# Patient Record
Sex: FEMALE | Race: WHITE | NOT HISPANIC OR LATINO | Employment: OTHER | URBAN - METROPOLITAN AREA
[De-identification: names, ages, dates, MRNs, and addresses within clinical notes are randomized per-mention and may not be internally consistent; named-entity substitution may affect disease eponyms.]

---

## 2017-09-05 ENCOUNTER — TELEPHONE (OUTPATIENT)
Dept: ONCOLOGY | Facility: CLINIC | Age: 57
End: 2017-09-05

## 2019-04-09 ENCOUNTER — VIRTUAL VISIT (OUTPATIENT)
Dept: FAMILY MEDICINE | Facility: OTHER | Age: 59
End: 2019-04-09

## 2019-04-09 NOTE — PROGRESS NOTES
"Date:   Clinician: Radha Villareal  Clinician NPI: [id number]  Patient: Brittany Michael  Patient : 1960  Patient Address: [address] [place], [place] [address]  Patient Phone: [phone]  Visit Protocol: Low back pain  Patient Summary:  Brittany is a 58 year old ( : 1960 ) female who initiated a Visit for evaluation of Low Back Pain. When asked the question \"Please sign me up to receive news, health information and promotions from [employer].\", Brittany responded \"No\".   A synchronous visit is necessary because the patient reported the following abnormal symptoms:   Severe pain that began suddenly and age &gt; 50   Her back pain began suddenly 1-6 days ago. The pain is present on both sides and is located in the mid back area.   The pain varies depending on the activity and goes away when resting.   She is able to walk on her toes and is able to walk on her heels with her toes lifted off the ground.   Brittany is experiencing back muscle spasms.    Symptom Details   Pain: The pain is severe (7-9 on a 10 point pain scale).    Brittany denies feeling feverish, loss of bladder control, a rash in the same area as the back pain, abdominal pain, loss of bowel control, saddle anesthesia, numbness or tingling in the legs, shooting pain, urinary frequency, vomiting, urinary retention, leg weakness, dysuria, and hematuria. Her pain does not decrease when bending forward.   Precipitating events  The back pain did not begin in response to an injury that happened at her work. Her back pain began after sudden bending or lifting.   Pertinent medical history  She has had back surgery. Details about back surgery as reported by the patient (free text): Lumbar fusion approximately 17 years ago (not kimo of date)   She also has been diagnosed with cancer. Details about cancer history as reported by the patient (free text): Invasive ductile carcinoma left breast  Brittany has not had similar episodes of back pain in " the past. She has not had kidney stones and unintended weight loss in the last three months.   She has not seen a provider to treat this episode of low back pain.   Treatments  Brittany tried using therapeutic remedies (such as cold pack, heat, chiropractic treatment, physical therapy) and over-the-counter medications to manage her low back pain.   Additional details about medications tried as reported by the patient (free text): Ibuprofen 200mg 2-3 pills 3 times a day excedrin extra strength 2 pills one time Sunday afternoon   Other approaches used to manage the back pain as reported by the patient (free text): Rest, ice packs   Brittany has not tried using prescription medications to manage her back pain.   Brittany does not smoke or use smokeless tobacco.   MEDICATIONS: hydroxyzine pamoate oral, quetiapine oral, ALLERGIES: NKDA  Clinician Response:  Dear Brittany,   Based on the information you have provided, you may have low back pain due to pinched or compressed spinal nerves.  When the nerve root becomes compressed, the pressure could cause various symptoms including pain, weakness, numbness, and tingling. A more complete health history, examination, and testing are possibly needed. For this reason, I would like you to make an appointment to be seen in person.  Medication information  I am prescribing:     Cyclobenzaprine 10 mg oral tablet. Take 1 tablet by mouth 3 times per day as needed for 7 days. There are no refills with this prescription.   Unless you are allergic to the over-the-counter medication(s) below, I recommend using:       Acetaminophen (Tylenol or store brand) oral tablet. Take 1-2 pills by mouth every 4-6 hours as needed for pain.      Ibuprofen (Advil or store brand) 200 mg oral tablet. Take 2-3 200mg tablets (400-600mg) by mouth every 8 hours for pain. Do not exceed 2400mg in 24 hours.     Taking this medication with food will decrease the risk of stomach discomfort.  Over-the-counter medications do  not require a prescription. Ask the pharmacist if you have any questions.  Self care  The following tips will help prevent and reduce back pain:     Apply heating pads on the sore area for a short duration (10 minutes per hour and as needed). A hot bath or a heating pad on your lower back may also help reduce pain and stiffness.    Maintaining a good posture reduces stress on the back muscles. To help reduce the stress on your back:    Stand and sit up straight.    Try not to slouch when standing or sitting.    Try not to stay in the same position for a long time.    Switch positions every 20 to 30 minutes if possible.    When lifting heavy objects, squat down and use your legs rather than bending at the waist.          Stress can make your back pain worse. For this reason, take time to relax and try some relaxation techniques when you feel stressed.    Click the following link for more information: Prevent back pain.     When to seek care  Please make an appointment to be seen in a clinic or urgent care if any of the following occur:  You develop new symptoms or your symptoms becomes worse  A painful rash that appears as a stripe along one side of the body  Unexplained fever  Unbearable pain  Unrelenting night pain  Seek immediate medical care if you have problems with bowel or bladder control, worsening weakness or numbness in your legs, or numbness in the inner thighs, groin, or buttocks.   Diagnosis: Acute radiculopathy/radiculitis  Diagnosis ICD: M54.10  Triage Notes: I reviewed the patient's history, verified their identity, and explained the phone visit process.    She was lifting items on her front porch when she feels like she pulled a muscle in her upper back.  Synchronous Triage: phone, status: completed, duration: 156 seconds  Prescription: cyclobenzaprine 10 mg oral tablet 21 tablet, 7 days supply. Take 1 tablet by mouth 3 times per day as needed for 7 days. Refills: 0, Refill as needed: no, Allow  substitutions: yes  Pharmacy: [facility] - [phone] - [address] [place] [id number]

## 2020-01-23 ENCOUNTER — OFFICE VISIT (OUTPATIENT)
Dept: FAMILY MEDICINE | Facility: CLINIC | Age: 60
End: 2020-01-23
Payer: COMMERCIAL

## 2020-01-23 VITALS
HEIGHT: 71 IN | WEIGHT: 182.6 LBS | HEART RATE: 74 BPM | SYSTOLIC BLOOD PRESSURE: 126 MMHG | BODY MASS INDEX: 25.56 KG/M2 | DIASTOLIC BLOOD PRESSURE: 79 MMHG | RESPIRATION RATE: 12 BRPM

## 2020-01-23 DIAGNOSIS — Z13.1 DIABETES MELLITUS SCREENING: ICD-10-CM

## 2020-01-23 DIAGNOSIS — Z13.220 LIPID SCREENING: ICD-10-CM

## 2020-01-23 DIAGNOSIS — G47.00 INSOMNIA, UNSPECIFIED TYPE: ICD-10-CM

## 2020-01-23 DIAGNOSIS — R91.8 PULMONARY NODULES: ICD-10-CM

## 2020-01-23 DIAGNOSIS — Z12.31 ENCOUNTER FOR SCREENING MAMMOGRAM FOR BREAST CANCER: ICD-10-CM

## 2020-01-23 DIAGNOSIS — F10.21 ALCOHOLISM IN REMISSION (H): ICD-10-CM

## 2020-01-23 DIAGNOSIS — M25.50 POLYARTHRALGIA: ICD-10-CM

## 2020-01-23 DIAGNOSIS — F32.A DEPRESSION, UNSPECIFIED DEPRESSION TYPE: ICD-10-CM

## 2020-01-23 DIAGNOSIS — Z00.00 ROUTINE GENERAL MEDICAL EXAMINATION AT A HEALTH CARE FACILITY: Primary | ICD-10-CM

## 2020-01-23 DIAGNOSIS — Z23 NEED FOR PROPHYLACTIC VACCINATION AND INOCULATION AGAINST INFLUENZA: ICD-10-CM

## 2020-01-23 DIAGNOSIS — F41.9 ANXIETY: ICD-10-CM

## 2020-01-23 DIAGNOSIS — Z85.3 HX: BREAST CANCER: ICD-10-CM

## 2020-01-23 LAB
CHOLEST SERPL-MCNC: 238 MG/DL
CREAT SERPL-MCNC: 0.92 MG/DL (ref 0.52–1.04)
GFR SERPL CREATININE-BSD FRML MDRD: 67 ML/MIN/{1.73_M2}
GLUCOSE SERPL-MCNC: 94 MG/DL (ref 70–99)
HDLC SERPL-MCNC: 52 MG/DL
LDLC SERPL CALC-MCNC: 159 MG/DL
NONHDLC SERPL-MCNC: 186 MG/DL
TRIGL SERPL-MCNC: 136 MG/DL

## 2020-01-23 PROCEDURE — 99214 OFFICE O/P EST MOD 30 MIN: CPT | Mod: 25 | Performed by: PHYSICIAN ASSISTANT

## 2020-01-23 PROCEDURE — 90471 IMMUNIZATION ADMIN: CPT | Performed by: PHYSICIAN ASSISTANT

## 2020-01-23 PROCEDURE — 90472 IMMUNIZATION ADMIN EACH ADD: CPT | Performed by: PHYSICIAN ASSISTANT

## 2020-01-23 PROCEDURE — 36415 COLL VENOUS BLD VENIPUNCTURE: CPT | Performed by: PHYSICIAN ASSISTANT

## 2020-01-23 PROCEDURE — 82947 ASSAY GLUCOSE BLOOD QUANT: CPT | Performed by: PHYSICIAN ASSISTANT

## 2020-01-23 PROCEDURE — 90715 TDAP VACCINE 7 YRS/> IM: CPT | Performed by: PHYSICIAN ASSISTANT

## 2020-01-23 PROCEDURE — 80061 LIPID PANEL: CPT | Performed by: PHYSICIAN ASSISTANT

## 2020-01-23 PROCEDURE — 90682 RIV4 VACC RECOMBINANT DNA IM: CPT | Performed by: PHYSICIAN ASSISTANT

## 2020-01-23 PROCEDURE — 82565 ASSAY OF CREATININE: CPT | Performed by: PHYSICIAN ASSISTANT

## 2020-01-23 PROCEDURE — 99386 PREV VISIT NEW AGE 40-64: CPT | Mod: 25 | Performed by: PHYSICIAN ASSISTANT

## 2020-01-23 RX ORDER — CELECOXIB 200 MG/1
200 CAPSULE ORAL 2 TIMES DAILY PRN
Qty: 180 CAPSULE | Refills: 3 | Status: SHIPPED | OUTPATIENT
Start: 2020-01-23 | End: 2021-03-19

## 2020-01-23 RX ORDER — BUPROPION HYDROCHLORIDE 300 MG/1
300 TABLET ORAL EVERY MORNING
Qty: 90 TABLET | Refills: 3 | Status: SHIPPED | OUTPATIENT
Start: 2020-01-23 | End: 2021-03-19

## 2020-01-23 RX ORDER — AMITRIPTYLINE HYDROCHLORIDE 10 MG/1
10 TABLET ORAL AT BEDTIME
Qty: 90 TABLET | Refills: 11 | Status: SHIPPED | OUTPATIENT
Start: 2020-01-23 | End: 2021-03-19

## 2020-01-23 RX ORDER — BUPROPION HYDROCHLORIDE 300 MG/1
TABLET ORAL
COMMUNITY
Start: 2019-08-06 | End: 2020-01-23

## 2020-01-23 RX ORDER — CELECOXIB 200 MG/1
CAPSULE ORAL
COMMUNITY
Start: 2019-10-01 | End: 2020-01-23

## 2020-01-23 RX ORDER — HYDROXYZINE PAMOATE 25 MG/1
CAPSULE ORAL
COMMUNITY
Start: 2019-06-11 | End: 2020-01-23

## 2020-01-23 RX ORDER — HYDROXYZINE PAMOATE 25 MG/1
CAPSULE ORAL
Qty: 90 CAPSULE | Refills: 1 | Status: SHIPPED | OUTPATIENT
Start: 2020-01-23 | End: 2020-07-08

## 2020-01-23 RX ORDER — QUETIAPINE FUMARATE 50 MG/1
TABLET, FILM COATED ORAL
COMMUNITY
Start: 2019-08-06 | End: 2020-01-23 | Stop reason: SINTOL

## 2020-01-23 ASSESSMENT — ENCOUNTER SYMPTOMS
WEAKNESS: 0
DYSURIA: 0
PALPITATIONS: 0
ABDOMINAL PAIN: 0
JOINT SWELLING: 0
NERVOUS/ANXIOUS: 1
DIARRHEA: 0
HEARTBURN: 0
COUGH: 0
EYE PAIN: 0
CONSTIPATION: 0
HEMATURIA: 0
MYALGIAS: 0
HEADACHES: 1
NAUSEA: 0
ARTHRALGIAS: 1
HEMATOCHEZIA: 0
CHILLS: 0
BREAST MASS: 0
FEVER: 0
FREQUENCY: 0
SHORTNESS OF BREATH: 0
PARESTHESIAS: 0
SORE THROAT: 0
DIZZINESS: 0

## 2020-01-23 ASSESSMENT — MIFFLIN-ST. JEOR: SCORE: 1494.4

## 2020-01-23 NOTE — LETTER
January 24, 2020      Brittany Michael  [address] [facility] [address]        Dear ,    We are writing to inform you of your test results.    Blood sugar is normal.    Kidney function ok.  Cholesterol is somewhat high.  This does not need medication, but should be treated with healthy lifestyle.  Several things can help your cholesterol.  Suggestions include healthy eating and physical activity.  A good physical activity goal for heart health is 30 minutes of moderate activity (such as walking at a speed which gets your heart rate up or your breathing up) 5 days per week. For healthy eating, consider the Mediterranean diet.  More information of this diet can be found at [email or web address].   Let's plan to recheck your cholesterol in 1 year.    Resulted Orders   GLUCOSE   Result Value Ref Range    Glucose 94 70 - 99 mg/dL      Comment:      Fasting specimen   Lipid panel reflex to direct LDL Non-fasting   Result Value Ref Range    Cholesterol 238 (H) <200 mg/dL      Comment:      Desirable:       <200 mg/dl    Triglycerides 136 <150 mg/dL      Comment:      Fasting specimen    HDL Cholesterol 52 >49 mg/dL    LDL Cholesterol Calculated 159 (H) <100 mg/dL      Comment:      Above desirable:  100-129 mg/dl  Borderline High:  130-159 mg/dL  High:             160-189 mg/dL  Very high:       >189 mg/dl      Non HDL Cholesterol 186 (H) <130 mg/dL      Comment:      Above Desirable:  130-159 mg/dl  Borderline high:  160-189 mg/dl  High:             190-219 mg/dl  Very high:       >219 mg/dl     Creatinine   Result Value Ref Range    Creatinine 0.92 0.52 - 1.04 mg/dL    GFR Estimate 67 >60 mL/min/[1.73_m2]      Comment:      Non  GFR Calc  Starting 12/18/2018, serum creatinine based estimated GFR (eGFR) will be   calculated using the Chronic Kidney Disease Epidemiology Collaboration    (CKD-EPI) equation.      GFR Estimate If Black 78 >60 mL/min/[1.73_m2]      Comment:       GFR Calc  Starting 12/18/2018, serum creatinine based estimated GFR (eGFR) will be   calculated using the Chronic Kidney Disease Epidemiology Collaboration   (CKD-EPI) equation.         If you have any questions or concerns, please call the clinic at the number listed above.       Sincerely,        Nelida Finney PA-C/hp
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
## 2020-01-23 NOTE — PATIENT INSTRUCTIONS
Cholesterol and blood sugar labs today   Decided to hold off on hiv and hep c labs until we get records  Hold off on chest xray until we get records  Oncology referral - see if they need to continue seeing you or a survivorship plan    Tetanus and flu shot today     Consider new shingles shot.  Need 2 doses.  Some people don't feel great day of, or for a few days.  How you feel after first dose does not predict whether you'll feel good or bad after next dose.  In case you have side effects, pick a time to get the vaccine that its ok if you feel a bit under the weather.  Take this precaution with both doses of the vaccine, even if you feel great after the first dose.  Pharmacy is often cheaper than clinic and can at least tell you your cost in advance, unlike a clinic.    Increase calcium intake.  Goal 1200 mg.    Stop quietapine.  Try amitriptyline instead.    Mammogram    [place] Mammo Schedule      [facility] Mammo Schedule  [facility] ~ [phone]  One Day Weekly- Alternating Days    [place] ~ [phone]  Every other Monday or Wednesday   & one Saturday morning a month    [place] ~ [phone]  Every Other Monday Morning    [place] ~ [phone]  Every Other Monday Afternoon    [place] ~ [phone]  Every Monday morning  Every Tuesday afternoon     Wed, Thurs, Friday morning & afternoon    Mammogram walk-in hours in [place]: Monday-Friday, 8 a.m. - 4 p.m.  Questions? Call [phone].      Preventive Health Recommendations  Female Ages 50 - 64    Yearly exam: See your health care provider every year in order to  o Review health changes.   o Discuss preventive care.    o Review your medicines if your doctor has prescribed any.      Get a Pap test every three years (unless you have an abnormal result and your provider advises testing more often).    If you get Pap tests with HPV test, you only need to test every 5 years, unless you have an abnormal result.     You do not need a Pap test if your  uterus was removed (hysterectomy) and you have not had cancer.    You should be tested each year for STDs (sexually transmitted diseases) if you're at risk.     Have a mammogram every 1 to 2 years.    Have a colonoscopy at age 50, or have a yearly FIT test (stool test). These exams screen for colon cancer.      Have a cholesterol test every 5 years, or more often if advised.    Have a diabetes test (fasting glucose) every three years. If you are at risk for diabetes, you should have this test more often.     If you are at risk for osteoporosis (brittle bone disease), think about having a bone density scan (DEXA).    Shots: Get a flu shot each year. Get a tetanus shot every 10 years.    Nutrition:     Eat at least 5 servings of fruits and vegetables each day.    Eat whole-grain bread, whole-wheat pasta and brown rice instead of white grains and rice.    Get adequate Calcium and Vitamin D.     Lifestyle    Exercise at least 150 minutes a week (30 minutes a day, 5 days a week). This will help you control your weight and prevent disease.    Limit alcohol to one drink per day.    No smoking.     Wear sunscreen to prevent skin cancer.     See your dentist every six months for an exam and cleaning.    See your eye doctor every 1 to 2 years.

## 2020-01-23 NOTE — NURSING NOTE
"Chief Complaint   Patient presents with     Physical       Initial /79 (BP Location: Right arm, Patient Position: Chair, Cuff Size: Adult Regular)   Pulse 74   Resp 12   Ht 1.803 m (5' 11\")   Wt 82.8 kg (182 lb 9.6 oz)   LMP 05/04/2002   BMI 25.47 kg/m   Estimated body mass index is 25.47 kg/m  as calculated from the following:    Height as of this encounter: 1.803 m (5' 11\").    Weight as of this encounter: 82.8 kg (182 lb 9.6 oz).    Patient presents to the clinic using No DME    Health Maintenance that is potentially due pending provider review:  NONE        Is there anyone who you would like to be able to receive your results? No  If yes have patient fill out RAMON      "

## 2020-02-17 ENCOUNTER — OFFICE VISIT (OUTPATIENT)
Dept: FAMILY MEDICINE | Facility: CLINIC | Age: 60
End: 2020-02-17
Payer: COMMERCIAL

## 2020-02-17 DIAGNOSIS — N63.0 LUMP OR MASS IN BREAST: Primary | ICD-10-CM

## 2020-02-17 PROCEDURE — 99214 OFFICE O/P EST MOD 30 MIN: CPT | Performed by: FAMILY MEDICINE

## 2020-02-18 NOTE — PROGRESS NOTES
SUBJECTIVE: Brittany Michael is a 59 year old female    No chief complaint on file.     I am dictating this chart later in the day after clinic visit due to our computers being down.  She had concerns of lump in her left breast in the upper portion for the last week or 2.  She had breast exam during physical just 3 weeks ago.  She has had previous history of breast cancer in her left breast 14 to 15 years ago.  She was treated with a lumpectomy chemo and radiation.  She was on tamoxifen briefly but did not tolerate and this was discontinued.  She actually came in for a mammogram today but was recommended she be evaluated for diagnostic mammogram.    The lump does not cause any pain or symptoms.  Her last mammogram was over a year ago.  She had no other health concerns today  Patient Active Problem List   Diagnosis     HX: breast cancer     Pulmonary nodules     Depression, unspecified depression type     Alcoholism in remission (H)      OBJECTIVE:Last menstrual period 05/04/2002. BMI=There is no height or weight on file to calculate BMI.  GENERAL APPEARANCE ADULT: Alert, no acute distress  BREAST: Breasts both appear normal.  She has a scar in the upper left breast.  The area in question is just medial to that scar.  Both breasts are normal to palpation on my exam.  The area she is referring to she cannot feel when supine and with sitting she does note a lump.  When I feel that area it seems more like rib to me than the actual mass.    ASSESSMENT:   (N63.0) Lump or mass in breast  (primary encounter diagnosis)  Comment:   Plan: MA Diagnostic Digital Bilateral        I have ordered a diagnostic mammogram to evaluate this area as well as to do bilateral mammogram.  If the diagnostic studies are normal I would not recommend surgical consultation unless this area seems to changed over time.

## 2020-02-25 ENCOUNTER — HOSPITAL ENCOUNTER (OUTPATIENT)
Dept: MAMMOGRAPHY | Facility: CLINIC | Age: 60
Discharge: HOME | End: 2020-02-25
Attending: FAMILY MEDICINE | Admitting: FAMILY MEDICINE
Payer: COMMERCIAL

## 2020-02-25 ENCOUNTER — HOSPITAL ENCOUNTER (OUTPATIENT)
Dept: ULTRASOUND IMAGING | Facility: CLINIC | Age: 60
End: 2020-02-25
Attending: FAMILY MEDICINE
Payer: COMMERCIAL

## 2020-02-25 DIAGNOSIS — N63.0 LUMP OR MASS IN BREAST: ICD-10-CM

## 2020-02-25 PROCEDURE — 76642 ULTRASOUND BREAST LIMITED: CPT | Mod: LT

## 2020-02-25 PROCEDURE — G0279 TOMOSYNTHESIS, MAMMO: HCPCS

## 2020-07-08 DIAGNOSIS — F41.9 ANXIETY: ICD-10-CM

## 2020-07-08 RX ORDER — HYDROXYZINE PAMOATE 25 MG/1
CAPSULE ORAL
Qty: 90 CAPSULE | Refills: 1 | Status: SHIPPED | OUTPATIENT
Start: 2020-07-08 | End: 2020-12-24

## 2020-12-23 DIAGNOSIS — F41.9 ANXIETY: ICD-10-CM

## 2020-12-24 RX ORDER — HYDROXYZINE PAMOATE 25 MG/1
CAPSULE ORAL
Qty: 90 CAPSULE | Refills: 0 | Status: SHIPPED | OUTPATIENT
Start: 2020-12-24 | End: 2021-03-19

## 2021-03-15 DIAGNOSIS — F41.9 ANXIETY: ICD-10-CM

## 2021-03-17 RX ORDER — HYDROXYZINE PAMOATE 25 MG/1
CAPSULE ORAL
Qty: 90 CAPSULE | OUTPATIENT
Start: 2021-03-17

## 2021-03-19 ENCOUNTER — OFFICE VISIT (OUTPATIENT)
Dept: FAMILY MEDICINE | Facility: CLINIC | Age: 61
End: 2021-03-19
Payer: COMMERCIAL

## 2021-03-19 VITALS
OXYGEN SATURATION: 97 % | RESPIRATION RATE: 14 BRPM | HEIGHT: 71 IN | SYSTOLIC BLOOD PRESSURE: 110 MMHG | TEMPERATURE: 97.2 F | DIASTOLIC BLOOD PRESSURE: 72 MMHG | WEIGHT: 182 LBS | HEART RATE: 58 BPM | BODY MASS INDEX: 25.48 KG/M2

## 2021-03-19 DIAGNOSIS — E78.5 DYSLIPIDEMIA: ICD-10-CM

## 2021-03-19 DIAGNOSIS — Z11.59 NEED FOR HEPATITIS C SCREENING TEST: ICD-10-CM

## 2021-03-19 DIAGNOSIS — F12.10 CANNABIS ABUSE, EPISODIC: ICD-10-CM

## 2021-03-19 DIAGNOSIS — F32.A DEPRESSION, UNSPECIFIED DEPRESSION TYPE: Primary | ICD-10-CM

## 2021-03-19 DIAGNOSIS — Z11.4 ENCOUNTER FOR SCREENING FOR HIV: ICD-10-CM

## 2021-03-19 DIAGNOSIS — G47.00 INSOMNIA, UNSPECIFIED TYPE: ICD-10-CM

## 2021-03-19 DIAGNOSIS — Z13.220 LIPID SCREENING: ICD-10-CM

## 2021-03-19 DIAGNOSIS — F41.9 ANXIETY: ICD-10-CM

## 2021-03-19 DIAGNOSIS — F10.21 ALCOHOLISM IN REMISSION (H): ICD-10-CM

## 2021-03-19 LAB
CHOLEST SERPL-MCNC: 207 MG/DL
HCV AB SERPL QL IA: NONREACTIVE
HDLC SERPL-MCNC: 56 MG/DL
HIV 1+2 AB+HIV1 P24 AG SERPL QL IA: NONREACTIVE
LDLC SERPL CALC-MCNC: 134 MG/DL
NONHDLC SERPL-MCNC: 151 MG/DL
TRIGL SERPL-MCNC: 86 MG/DL

## 2021-03-19 PROCEDURE — 36415 COLL VENOUS BLD VENIPUNCTURE: CPT | Performed by: PHYSICIAN ASSISTANT

## 2021-03-19 PROCEDURE — 87389 HIV-1 AG W/HIV-1&-2 AB AG IA: CPT | Performed by: PHYSICIAN ASSISTANT

## 2021-03-19 PROCEDURE — 86803 HEPATITIS C AB TEST: CPT | Performed by: PHYSICIAN ASSISTANT

## 2021-03-19 PROCEDURE — 99214 OFFICE O/P EST MOD 30 MIN: CPT | Performed by: PHYSICIAN ASSISTANT

## 2021-03-19 PROCEDURE — 80061 LIPID PANEL: CPT | Performed by: PHYSICIAN ASSISTANT

## 2021-03-19 RX ORDER — AMITRIPTYLINE HYDROCHLORIDE 10 MG/1
10 TABLET ORAL AT BEDTIME
Qty: 90 TABLET | Refills: 11 | Status: SHIPPED | OUTPATIENT
Start: 2021-03-19 | End: 2021-10-13

## 2021-03-19 RX ORDER — FLUOXETINE 10 MG/1
10 CAPSULE ORAL DAILY
Qty: 30 CAPSULE | Refills: 0 | Status: SHIPPED | OUTPATIENT
Start: 2021-03-19 | End: 2021-10-13

## 2021-03-19 RX ORDER — BUPROPION HYDROCHLORIDE 150 MG/1
150 TABLET ORAL EVERY MORNING
Qty: 30 TABLET | Refills: 0 | Status: SHIPPED | OUTPATIENT
Start: 2021-03-19 | End: 2021-10-13

## 2021-03-19 RX ORDER — HYDROXYZINE PAMOATE 25 MG/1
CAPSULE ORAL
Qty: 90 CAPSULE | Refills: 0 | Status: SHIPPED | OUTPATIENT
Start: 2021-03-19 | End: 2021-10-13

## 2021-03-19 ASSESSMENT — ANXIETY QUESTIONNAIRES
GAD7 TOTAL SCORE: 5
6. BECOMING EASILY ANNOYED OR IRRITABLE: NOT AT ALL
7. FEELING AFRAID AS IF SOMETHING AWFUL MIGHT HAPPEN: NOT AT ALL
7. FEELING AFRAID AS IF SOMETHING AWFUL MIGHT HAPPEN: NOT AT ALL
2. NOT BEING ABLE TO STOP OR CONTROL WORRYING: NOT AT ALL
GAD7 TOTAL SCORE: 5
1. FEELING NERVOUS, ANXIOUS, OR ON EDGE: MORE THAN HALF THE DAYS
3. WORRYING TOO MUCH ABOUT DIFFERENT THINGS: NOT AT ALL
GAD7 TOTAL SCORE: 5
5. BEING SO RESTLESS THAT IT IS HARD TO SIT STILL: SEVERAL DAYS
4. TROUBLE RELAXING: MORE THAN HALF THE DAYS

## 2021-03-19 ASSESSMENT — PATIENT HEALTH QUESTIONNAIRE - PHQ9
SUM OF ALL RESPONSES TO PHQ QUESTIONS 1-9: 4
SUM OF ALL RESPONSES TO PHQ QUESTIONS 1-9: 4

## 2021-03-19 ASSESSMENT — MIFFLIN-ST. JEOR: SCORE: 1486.68

## 2021-03-19 NOTE — PROGRESS NOTES
"Assessment & Plan     Depression, unspecified depression type  worsened  - buPROPion (WELLBUTRIN XL) 150 MG 24 hr tablet; Take 1 tablet (150 mg) by mouth every morning    Insomnia, unspecified type  worsened  - amitriptyline (ELAVIL) 10 MG tablet; Take 1 tablet (10 mg) by mouth At Bedtime Can increase to 2 pills in 3 nights, and to 3 tabs in 3 nights if needed.    Anxiety  worsened  - FLUoxetine (PROZAC) 10 MG capsule; Take 1 capsule (10 mg) by mouth daily  - hydrOXYzine (VISTARIL) 25 MG capsule; TAKE 2 CAPSULES 3 TIMES A DAY AS NEEDED FOR ANXIETY ATTACK    Alcoholism in remission (H)  stable    Cannabis abuse, episodic  New issue    Dyslipidemia  Lipid screening  - Lipid panel reflex to direct LDL Non-fasting    Need for hepatitis C screening test  - Hepatitis C Screen Reflex to HCV RNA Quant and Genotype    Encounter for screening for HIV  - HIV Antigen Antibody Combo    Patient Instructions   Restarting lower dose of wellbutrin   Start prozac 10 mg to help anxiety   Can retry sleep medication amitriptyline  Refilled hydroxyzine to have on hand   Sign for psychiatry records  Follow up here in 4 wks, or with psychiatry     Rechecking cholesterol       No follow-ups on file.    AUDREY [facility]    Marcellus Soto is a 60 year old who presents for the following health issues     HPI     Depression and Anxiety Follow-Up    How are you doing with your depression since your last visit? Feels good, but sometimes anxiety takes over    How are you doing with your anxiety since your last visit?  No change     Are you having other symptoms that might be associated with depression or anxiety? No    Have you had a significant life event? No     Do you have any concerns with your use of alcohol or other drugs? No    Moved from city and bought hobby farm 2 yrs ago.  Has really enjoyed the \"haven\" home has become in the past 1 yr.  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  Some stress with not seeing daughter and some higher health risk family members for a year.  But things not all good, has been considering seeing her psychiatrist that she's seen for many years.    Hard to stay asleep. Hard to be still.  Still anxious.      Took self off wellbutrin and amitriptyline.  Thinks amitriptyline was ok but not great, and can't remember why she stopped it.  Does take her hydroxyzine most evenings.  Gets very energized and talkative.  Psychiatry said she was not quite bipolar.  Has remained sober from alcohol.  Has been experimented with CBD and marijuana - smoked marijuana helps more than edible but expensive.      Dyslipidemia - has not been eating well lately.    Social History     Tobacco Use     Smoking status: Former Smoker     Packs/day: 1.00     Years: 26.00     Pack years: 26.00     Types: Cigarettes     Quit date: 2002     Years since quittin.8     Smokeless tobacco: Never Used   Substance Use Topics     Alcohol use: Yes     Comment: beer daily in the evening     Drug use: No     PHQ 3/19/2021   PHQ-9 Total Score 4   Q9: Thoughts of better off dead/self-harm past 2 weeks Not at all     PORTER-7 SCORE 3/19/2021   Total Score 5 (mild anxiety)   Total Score 5       How many servings of fruits and vegetables do you eat daily?  2-3    On average, how many sweetened beverages do you drink each day (Examples: soda, juice, sweet tea, etc.  Do NOT count diet or artificially sweetened beverages)?   0    How many days per week do you exercise enough to make your heart beat faster? 3 or less    How many minutes a day do you exercise enough to make your heart beat faster? 9 or less    How many days per week do you miss taking your medication? 0, but does admit to not consistently taking wellbutrin due to it making her feel \"numb\" if she's consistently taking it.          Objective    LMP 2002   There is no height or weight on " file to calculate BMI.  Physical Exam   GENERAL: healthy, alert and no distress  PSYCH: mentation appears normal, affect intermittently tearful

## 2021-03-19 NOTE — LETTER
March 23, 2021      Brittany Michael  [address] [address]        Dear ,    We are writing to inform you of your test results.    HIV and hepatitis C screen was normal.  Cholesterol is still somewhat high, but improved considerably from 1 year ago.  Continue healthy lifestyle efforts.  Let me know if you have questions.     Resulted Orders   Lipid panel reflex to direct LDL Non-fasting   Result Value Ref Range    Cholesterol 207 (H) <200 mg/dL      Comment:      Desirable:       <200 mg/dl    Triglycerides 86 <150 mg/dL      Comment:      Fasting specimen    HDL Cholesterol 56 >49 mg/dL    LDL Cholesterol Calculated 134 (H) <100 mg/dL      Comment:      Above desirable:  100-129 mg/dl  Borderline High:  130-159 mg/dL  High:             160-189 mg/dL  Very high:       >189 mg/dl      Non HDL Cholesterol 151 (H) <130 mg/dL      Comment:      Above Desirable:  130-159 mg/dl  Borderline high:  160-189 mg/dl  High:             190-219 mg/dl  Very high:       >219 mg/dl     HIV Antigen Antibody Combo   Result Value Ref Range    HIV Antigen Antibody Combo Nonreactive NR^Nonreactive          Comment:      HIV-1 p24 Ag & HIV-1/HIV-2 Ab Not Detected   Hepatitis C Screen Reflex to HCV RNA Quant and Genotype   Result Value Ref Range    Hepatitis C Antibody Nonreactive NR^Nonreactive      Comment:      Assay performance characteristics have not been established for newborns,   infants, and children         If you have any questions or concerns, please call the clinic at the number listed above.       Sincerely,      Nelida Finney PA-C

## 2021-03-19 NOTE — PATIENT INSTRUCTIONS
Restarting lower dose of wellbutrin   Start prozac 10 mg to help anxiety   Can retry sleep medication amitriptyline  Refilled hydroxyzine to have on hand   Sign for psychiatry records  Follow up here in 4 wks, or with psychiatry     Rechecking cholesterol

## 2021-03-20 ASSESSMENT — ANXIETY QUESTIONNAIRES: GAD7 TOTAL SCORE: 5

## 2021-03-22 NOTE — RESULT ENCOUNTER NOTE
Please place any/all future orders discussed below.    Please notify patient -- HIV and hepatitis C screen was normal.  Cholesterol is still somewhat high, but improved considerably from 1 year ago.  Continue healthy lifestyle efforts.  Let me know if you have questions.

## 2021-06-18 ENCOUNTER — NURSE TRIAGE (OUTPATIENT)
Dept: NURSING | Facility: CLINIC | Age: 61
End: 2021-06-18

## 2021-06-18 ENCOUNTER — HOSPITAL ENCOUNTER (EMERGENCY)
Facility: CLINIC | Age: 61
Discharge: HOME | End: 2021-06-18
Attending: NURSE PRACTITIONER | Admitting: NURSE PRACTITIONER
Payer: COMMERCIAL

## 2021-06-18 VITALS
SYSTOLIC BLOOD PRESSURE: 134 MMHG | WEIGHT: 175 LBS | RESPIRATION RATE: 16 BRPM | TEMPERATURE: 97.6 F | BODY MASS INDEX: 24.41 KG/M2 | OXYGEN SATURATION: 96 % | DIASTOLIC BLOOD PRESSURE: 74 MMHG | HEART RATE: 56 BPM

## 2021-06-18 DIAGNOSIS — R07.89 CHEST WALL PAIN: ICD-10-CM

## 2021-06-18 LAB
ANION GAP SERPL CALCULATED.3IONS-SCNC: 5 MMOL/L (ref 3–14)
BASOPHILS # BLD AUTO: 0.1 10E9/L (ref 0–0.2)
BASOPHILS NFR BLD AUTO: 1.1 %
BUN SERPL-MCNC: 11 MG/DL (ref 7–30)
CALCIUM SERPL-MCNC: 8.3 MG/DL (ref 8.5–10.1)
CHLORIDE SERPL-SCNC: 109 MMOL/L (ref 94–109)
CO2 SERPL-SCNC: 26 MMOL/L (ref 20–32)
CREAT SERPL-MCNC: 0.81 MG/DL (ref 0.52–1.04)
D DIMER PPP FEU-MCNC: 0.6 UG/ML FEU (ref 0–0.5)
DIFFERENTIAL METHOD BLD: NORMAL
EOSINOPHIL # BLD AUTO: 0.2 10E9/L (ref 0–0.7)
EOSINOPHIL NFR BLD AUTO: 1.8 %
ERYTHROCYTE [DISTWIDTH] IN BLOOD BY AUTOMATED COUNT: 12.4 % (ref 10–15)
GFR SERPL CREATININE-BSD FRML MDRD: 77 ML/MIN/{1.73_M2}
GLUCOSE SERPL-MCNC: 86 MG/DL (ref 70–99)
HCT VFR BLD AUTO: 40.4 % (ref 35–47)
HGB BLD-MCNC: 13.4 G/DL (ref 11.7–15.7)
IMM GRANULOCYTES # BLD: 0 10E9/L (ref 0–0.4)
IMM GRANULOCYTES NFR BLD: 0.2 %
LYMPHOCYTES # BLD AUTO: 2.5 10E9/L (ref 0.8–5.3)
LYMPHOCYTES NFR BLD AUTO: 29 %
MCH RBC QN AUTO: 30.1 PG (ref 26.5–33)
MCHC RBC AUTO-ENTMCNC: 33.2 G/DL (ref 31.5–36.5)
MCV RBC AUTO: 91 FL (ref 78–100)
MONOCYTES # BLD AUTO: 0.6 10E9/L (ref 0–1.3)
MONOCYTES NFR BLD AUTO: 7.6 %
NEUTROPHILS # BLD AUTO: 5.1 10E9/L (ref 1.6–8.3)
NEUTROPHILS NFR BLD AUTO: 60.3 %
NRBC # BLD AUTO: 0 10*3/UL
NRBC BLD AUTO-RTO: 0 /100
PLATELET # BLD AUTO: 279 10E9/L (ref 150–450)
POTASSIUM SERPL-SCNC: 4.2 MMOL/L (ref 3.4–5.3)
RBC # BLD AUTO: 4.45 10E12/L (ref 3.8–5.2)
SODIUM SERPL-SCNC: 140 MMOL/L (ref 133–144)
TROPONIN I SERPL-MCNC: <0.015 UG/L (ref 0–0.04)
WBC # BLD AUTO: 8.4 10E9/L (ref 4–11)

## 2021-06-18 PROCEDURE — 93005 ELECTROCARDIOGRAM TRACING: CPT | Performed by: NURSE PRACTITIONER

## 2021-06-18 PROCEDURE — 80048 BASIC METABOLIC PNL TOTAL CA: CPT | Performed by: NURSE PRACTITIONER

## 2021-06-18 PROCEDURE — 85379 FIBRIN DEGRADATION QUANT: CPT | Performed by: NURSE PRACTITIONER

## 2021-06-18 PROCEDURE — 84484 ASSAY OF TROPONIN QUANT: CPT | Performed by: NURSE PRACTITIONER

## 2021-06-18 PROCEDURE — 99284 EMERGENCY DEPT VISIT MOD MDM: CPT | Mod: 25 | Performed by: NURSE PRACTITIONER

## 2021-06-18 PROCEDURE — 93010 ELECTROCARDIOGRAM REPORT: CPT | Performed by: NURSE PRACTITIONER

## 2021-06-18 PROCEDURE — 99284 EMERGENCY DEPT VISIT MOD MDM: CPT | Performed by: NURSE PRACTITIONER

## 2021-06-18 PROCEDURE — 85025 COMPLETE CBC W/AUTO DIFF WBC: CPT | Performed by: NURSE PRACTITIONER

## 2021-06-18 RX ORDER — IOPAMIDOL 755 MG/ML
76 INJECTION, SOLUTION INTRAVASCULAR ONCE
Status: DISCONTINUED | OUTPATIENT
Start: 2021-06-18 | End: 2021-06-18 | Stop reason: HOSPADM

## 2021-06-18 NOTE — TELEPHONE ENCOUNTER
Brittany reports severe, constant chest pain for days now.     Thinks it is caused by a pulled chest muscle after doing strenuous yardwork a few days ago.    States that pain started on the center of her chest and now moved to left side. Constant pain. Ibuprofen helped a bit.  Movement makes pain worse.      Per protocol, advised ED. Care advice reviewed. Patient verbalizes understanding and will head to [place] ED. Advised to call back with further questions/concerns.     Jililan Raymundo RN/[facility] Nurse Advisor      Reason for Disposition    SEVERE chest pain    Additional Information    Negative: Severe difficulty breathing (e.g., struggling for each breath, speaks in single words)    Negative: Passed out (i.e., fainted, collapsed and was not responding)    Negative: Difficult to awaken or acting confused (e.g., disoriented, slurred speech)    Negative: Shock suspected (e.g., cold/pale/clammy skin, too weak to stand, low BP, rapid pulse)    Negative: Chest pain lasting longer than 5 minutes and ANY of the following:* Over 45 years old* Over 30 years old and at least one cardiac risk factor (e.g., diabetes, high blood pressure, high cholesterol, smoker, or strong family history of heart disease)* History of heart disease (i.e., angina, heart attack, heart failure, bypass surgery, takes nitroglycerin)* Pain is crushing, pressure-like, or heavy    Negative: Heart beating < 50 beats per minute OR > 140 beats per minute    Negative: Visible sweat on face or sweat dripping down face    Negative: Sounds like a life-threatening emergency to the triager    Protocols used: CHEST PAIN-A-OH

## 2021-06-18 NOTE — ED TRIAGE NOTES
midsternal chest pain several days  Radiating into left breast started today  Dull ache but has waves of sharp pain  Provoked with physical exertion  Palliates with rest and ibyprofen  Reproducible with palpation  No dizziness, no n/v, no diaphoresis, no cough, no fevers

## 2021-06-18 NOTE — ED PROVIDER NOTES
History     Chief Complaint   Patient presents with     Chest Pain     midsternal with exertion     HPI  Brittany Michael is a 60 year old female with past medical history of depression, alcoholism in remission, breast cancer who presents to the emergency department with concerns of a 4-day history of midsternal chest pain radiating into the left breast.  Patient reports onset of symptoms was 4 days ago after extensive yard work and use of a weed whip.  Patient describes the pain in the left side of chest as a dull ache and rates this as a 1.  Patient reports that movement is aggravating and can cause the pain to increase to a 9 and describes that it is a sharp pain.  Patient reports that today it has moved a little bit lower into the left lower rib cage area as well.  Patient denying any dizziness, nausea, vomiting, diarrhea, diaphoresis, cough, shortness of breath, fever, aches, chills.  Patient has been taking ibuprofen 2-3 times daily and reports this is mildly helpful pill.    Allergies:  Allergies   Allergen Reactions     Adhesive Tape Swelling     STERISTRIPS     Amoxicillin Diarrhea       Problem List:    Patient Active Problem List    Diagnosis Date Noted     Depression, unspecified depression type 01/23/2020     Priority: Medium     History depression and anxiety, started after her breast cancer treatment.  Started doing very poorly, was thinking about ending things.  At some point during this became very unable to sleep. Used to drink to excess to try to help sleep.  Treated at [facility] 7 yrs ago.  Mood after multiple med tries does best on wellbutrin.  Extensive fam history with bipolar but no definite diagnosis for her, and didn't do well with lamotrigine.  Does somewhat wonder if she's had cachorro at times but she's liked it.  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         Alcoholism in remission (H)  2020     Priority: Medium     Treated in [facility] 2013.       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      BACK SURGERY      lumbar fusion     BIOPSY       BREAST SURGERY      Left breast lumpectomy, 2006     COLONOSCOPY  2012    Procedure:COLONOSCOPY; Surgeon:CASEY JOHNSON; Location:[facility]     GYN SURGERY      tubal ligation     HEMORRHOIDECTOMY EXTERNAL  2012    Procedure:HEMORRHOIDECTOMY EXTERNAL; HEMORRHOIDECTOMY, PROCTOPLASTY, PARTIAL INTERNAL SPHINCTEROTOMY, INTEROPERATIVE COLONOSCOPY; Surgeon:CASEY JOHNSON; Location:[facility]     HYSTERECTOMY, CESARIO      hysterectomy     ORTHOPEDIC SURGERY       PROCTOPLASTY  2012    Procedure:PROCTOPLASTY; Surgeon:CASEY JOHNSON; Location:[facility]     SPHINCTEROTOMY RECTUM  2012    Procedure:SPHINCTEROTOMY RECTUM; Surgeon:CASEY JOHNSON; Location:[facility]       Family History:    Family History   Problem Relation Age of Onset     Breast Cancer Mother      Cancer Mother         rectal cancer     Bipolar Disorder Sister      Unknown/Adopted Sister         neg breast biopsy     Psychotic Disorder Sister         depression, unsure if bipolar     Depression Father      Anxiety Disorder Father      Bipolar Disorder Father      Substance Abuse Father      Breast Cancer Niece      Breast Cancer Maternal Grandmother         and multiple sisters       Social History:  Marital Status:   [2]  Social History     Tobacco Use     Smoking  status: Former Smoker     Packs/day: 1.00     Years: 26.00     Pack years: 26.00     Types: Cigarettes     Quit date: 2002     Years since quittin.1     Smokeless tobacco: Never Used   Substance Use Topics     Alcohol use: Yes     Comment: beer daily in the evening     Drug use: No        Medications:    amitriptyline (ELAVIL) 10 MG tablet  buPROPion (WELLBUTRIN XL) 150 MG 24 hr tablet  FLUoxetine (PROZAC) 10 MG capsule  hydrOXYzine (VISTARIL) 25 MG capsule      Review of Systems  As mentioned above in the history present illness. All other systems were reviewed and are negative.    Physical Exam   BP: (!) 152/84  Pulse: 68  Temp: 97.6  F (36.4  C)  Resp: 16  Weight: 79.4 kg (175 lb)  SpO2: 98 %      Physical Exam  Vitals signs and nursing note reviewed.   Constitutional:       General: She is not in acute distress.     Appearance: She is well-developed. She is not diaphoretic.   HENT:      Head: Normocephalic and atraumatic.      Right Ear: External ear normal.      Left Ear: External ear normal.   Eyes:      General:         Right eye: No discharge.         Left eye: No discharge.      Conjunctiva/sclera: Conjunctivae normal.   Neck:      Musculoskeletal: Normal range of motion.   Cardiovascular:      Rate and Rhythm: Normal rate and regular rhythm.      Heart sounds: Normal heart sounds. No murmur. No friction rub.   Pulmonary:      Effort: Pulmonary effort is normal. No respiratory distress.      Breath sounds: Normal breath sounds. No stridor. No wheezing or rales.   Chest:      Chest wall: Tenderness (midsternal and left mid to lower chest wall anterior without swelling, bruising, crepitus, step-off) present. No mass, deformity, crepitus or edema.   Abdominal:      General: Bowel sounds are normal.      Palpations: Abdomen is soft.      Tenderness: There is no abdominal tenderness. There is no guarding or rebound.   Skin:     General: Skin is warm and dry.      Coloration: Skin is not pale.       Findings: No erythema or rash.   Neurological:      Mental Status: She is alert and oriented to person, place, and time.      Cranial Nerves: No cranial nerve deficit.   Psychiatric:         Mood and Affect: Mood normal.         ED Course        Procedures       EKG Interpretation:      EKG Number: 1  Interpreted by Katherine Tamez, JOHN DOMINGUEZ, Vasu Prince MD  Symptoms at time of EKG: chest pain   Rhythm: Normal sinus   Rate: Normal  Axis: Normal  Ectopy: None  Conduction: Normal  ST Segments/ T Waves: No ST-T wave changes and No acute ischemic changes  Q Waves: None  Comparison to prior: No old EKG available    Clinical Impression: normal EKG    Results for orders placed or performed during the hospital encounter of 06/18/21 (from the past 24 hour(s))   CBC with platelets differential   Result Value Ref Range    WBC 8.4 4.0 - 11.0 10e9/L    RBC Count 4.45 3.8 - 5.2 10e12/L    Hemoglobin 13.4 11.7 - 15.7 g/dL    Hematocrit 40.4 35.0 - 47.0 %    MCV 91 78 - 100 fl    MCH 30.1 26.5 - 33.0 pg    MCHC 33.2 31.5 - 36.5 g/dL    RDW 12.4 10.0 - 15.0 %    Platelet Count 279 150 - 450 10e9/L    Diff Method Automated Method     % Neutrophils 60.3 %    % Lymphocytes 29.0 %    % Monocytes 7.6 %    % Eosinophils 1.8 %    % Basophils 1.1 %    % Immature Granulocytes 0.2 %    Nucleated RBCs 0 0 /100    Absolute Neutrophil 5.1 1.6 - 8.3 10e9/L    Absolute Lymphocytes 2.5 0.8 - 5.3 10e9/L    Absolute Monocytes 0.6 0.0 - 1.3 10e9/L    Absolute Eosinophils 0.2 0.0 - 0.7 10e9/L    Absolute Basophils 0.1 0.0 - 0.2 10e9/L    Abs Immature Granulocytes 0.0 0 - 0.4 10e9/L    Absolute Nucleated RBC 0.0    D dimer quantitative   Result Value Ref Range    D Dimer 0.6 (H) 0.0 - 0.50 ug/ml FEU   Basic metabolic panel   Result Value Ref Range    Sodium 140 133 - 144 mmol/L    Potassium 4.2 3.4 - 5.3 mmol/L    Chloride 109 94 - 109 mmol/L    Carbon Dioxide 26 20 - 32 mmol/L    Anion Gap 5 3 - 14 mmol/L    Glucose 86 70 - 99 mg/dL    Urea Nitrogen  11 7 - 30 mg/dL    Creatinine 0.81 0.52 - 1.04 mg/dL    GFR Estimate 77 >60 mL/min/[1.73_m2]    GFR Estimate If Black 89 >60 mL/min/[1.73_m2]    Calcium 8.3 (L) 8.5 - 10.1 mg/dL   Troponin I   Result Value Ref Range    Troponin I ES <0.015 0.000 - 0.045 ug/L       Medications   iopamidol (ISOVUE-370) solution 76 mL (has no administration in time range)   sodium chloride 0.9 % bag 500mL for CT scan flush use (has no administration in time range)       Assessments & Plan (with Medical Decision Making)  Brittany Michael is a 60 year old female with past medical history of depression, alcoholism in remission, breast cancer who presents to the emergency department with concerns of a 4-day history of midsternal chest pain radiating into the left breast noted after working in the garden including pulling weeds and utilizing a weed whip.  Patient denying any soft tissue swelling, bruising, fever, aches, chills, sweats.  Due to history of breast cancer even though stable vital signs D-dimer obtained and is 0.6 which based upon patient's age of 60 is within normal limits.  Reviewed all these findings with patient.   EKG and troponin revealing no sign ACS, STEMI, NSTEMI.Patient feels comfortable with discharge and treating as musculoskeletal pain.  Patient discharged in stable condition.     I have reviewed the nursing notes.    I have reviewed the findings, diagnosis, plan and need for follow up with the patient.    New Prescriptions    No medications on file       Final diagnoses:   Chest wall pain       6/18/2021   [facility] EMERGENCY DEPT     Katherine Tamez, APRN CNP  06/18/21 1748

## 2021-10-13 ENCOUNTER — OFFICE VISIT (OUTPATIENT)
Dept: FAMILY MEDICINE | Facility: CLINIC | Age: 61
End: 2021-10-13
Payer: COMMERCIAL

## 2021-10-13 VITALS
BODY MASS INDEX: 23.27 KG/M2 | HEART RATE: 75 BPM | HEIGHT: 71 IN | WEIGHT: 166.2 LBS | RESPIRATION RATE: 16 BRPM | OXYGEN SATURATION: 98 % | SYSTOLIC BLOOD PRESSURE: 110 MMHG | TEMPERATURE: 98.1 F | DIASTOLIC BLOOD PRESSURE: 64 MMHG

## 2021-10-13 DIAGNOSIS — F32.A DEPRESSION, UNSPECIFIED DEPRESSION TYPE: Primary | ICD-10-CM

## 2021-10-13 DIAGNOSIS — Z23 NEED FOR PROPHYLACTIC VACCINATION AND INOCULATION AGAINST INFLUENZA: ICD-10-CM

## 2021-10-13 DIAGNOSIS — M25.50 POLYARTHRALGIA: ICD-10-CM

## 2021-10-13 DIAGNOSIS — F41.9 ANXIETY: ICD-10-CM

## 2021-10-13 PROCEDURE — 90682 RIV4 VACC RECOMBINANT DNA IM: CPT | Performed by: PHYSICIAN ASSISTANT

## 2021-10-13 PROCEDURE — 96127 BRIEF EMOTIONAL/BEHAV ASSMT: CPT | Performed by: PHYSICIAN ASSISTANT

## 2021-10-13 PROCEDURE — 99214 OFFICE O/P EST MOD 30 MIN: CPT | Mod: 25 | Performed by: PHYSICIAN ASSISTANT

## 2021-10-13 PROCEDURE — 90471 IMMUNIZATION ADMIN: CPT | Performed by: PHYSICIAN ASSISTANT

## 2021-10-13 RX ORDER — HYDROXYZINE PAMOATE 25 MG/1
CAPSULE ORAL
Qty: 270 CAPSULE | Refills: 3 | Status: SHIPPED | OUTPATIENT
Start: 2021-10-13 | End: 2023-02-15

## 2021-10-13 RX ORDER — CELECOXIB 200 MG/1
200 CAPSULE ORAL 2 TIMES DAILY PRN
Qty: 180 CAPSULE | Refills: 3 | Status: SHIPPED | OUTPATIENT
Start: 2021-10-13 | End: 2022-06-27

## 2021-10-13 RX ORDER — BUPROPION HYDROCHLORIDE 150 MG/1
150 TABLET ORAL EVERY MORNING
Qty: 90 TABLET | Refills: 3 | Status: SHIPPED | OUTPATIENT
Start: 2021-10-13 | End: 2023-02-15

## 2021-10-13 ASSESSMENT — ANXIETY QUESTIONNAIRES
5. BEING SO RESTLESS THAT IT IS HARD TO SIT STILL: SEVERAL DAYS
2. NOT BEING ABLE TO STOP OR CONTROL WORRYING: SEVERAL DAYS
GAD7 TOTAL SCORE: 11
7. FEELING AFRAID AS IF SOMETHING AWFUL MIGHT HAPPEN: NOT AT ALL
1. FEELING NERVOUS, ANXIOUS, OR ON EDGE: NEARLY EVERY DAY
6. BECOMING EASILY ANNOYED OR IRRITABLE: NEARLY EVERY DAY
IF YOU CHECKED OFF ANY PROBLEMS ON THIS QUESTIONNAIRE, HOW DIFFICULT HAVE THESE PROBLEMS MADE IT FOR YOU TO DO YOUR WORK, TAKE CARE OF THINGS AT HOME, OR GET ALONG WITH OTHER PEOPLE: SOMEWHAT DIFFICULT
3. WORRYING TOO MUCH ABOUT DIFFERENT THINGS: SEVERAL DAYS

## 2021-10-13 ASSESSMENT — PAIN SCALES - GENERAL: PAINLEVEL: NO PAIN (0)

## 2021-10-13 ASSESSMENT — PATIENT HEALTH QUESTIONNAIRE - PHQ9
5. POOR APPETITE OR OVEREATING: MORE THAN HALF THE DAYS
SUM OF ALL RESPONSES TO PHQ QUESTIONS 1-9: 8

## 2021-10-13 ASSESSMENT — MIFFLIN-ST. JEOR: SCORE: 1402.07

## 2021-10-13 NOTE — PATIENT INSTRUCTIONS
Restart Wellbutrin and Hydroxyzine.     Follow-up in 1 month if symptoms are not well controlled. If controlled, you have enough medication for 1 year.

## 2021-10-13 NOTE — NURSING NOTE
"Chief Complaint   Patient presents with     Anxiety     Arthritis     feels increase aching- restarted some Celebrex she had at home - would like refill       Initial /64 (BP Location: Right arm, Patient Position: Chair, Cuff Size: Adult Regular)   Pulse 75   Temp 98.1  F (36.7  C) (Tympanic)   Resp 16   Ht 1.791 m (5' 10.5\")   Wt 75.4 kg (166 lb 3.2 oz)   LMP 05/04/2002   SpO2 98%   Breastfeeding No   BMI 23.51 kg/m   Estimated body mass index is 23.51 kg/m  as calculated from the following:    Height as of this encounter: 1.791 m (5' 10.5\").    Weight as of this encounter: 75.4 kg (166 lb 3.2 oz).    Patient presents to the clinic using No DME    Health Maintenance that is potentially due pending provider review:  Flu shot    Possibly completing today per provider review.    Is there anyone who you would like to be able to receive your results? No  If yes have patient fill out RAMON  Nan Pires CMA    "

## 2021-10-13 NOTE — PROGRESS NOTES
Assessment & Plan   Depression, unspecified depression type  Worsened since stopping Wellbutrin and Prozac over the summer. Thoughts things were better. Would like to restart Wellbutrin. If symptoms stable on Wellbutrin she can follow-up in 6 months to 1 year since she has been on this before. Follow-up sooner if not stable or well controlled.   - buPROPion (WELLBUTRIN XL) 150 MG 24 hr tablet; Take 1 tablet (150 mg) by mouth every morning    Anxiety  Worsened recently as well. Not at goal. Will restart Wellbutrin as above. Rx for Hydroxyzine as well. Continue to work on lifestyle changes and reducing anxiety.   - hydrOXYzine (VISTARIL) 25 MG capsule; TAKE 2 CAPSULES 3 TIMES A DAY AS NEEDED FOR ANXIETY ATTACK    Polyarthralgia  Stable. Refilled Celebrex.   - celecoxib (CELEBREX) 200 MG capsule; Take 1 capsule (200 mg) by mouth 2 times daily as needed for moderate pain    Need for prophylactic vaccination and inoculation against influenza  Updated flu shot today.   - INFLUENZA QUAD, RECOMBINANT, P-FREE (RIV4) (FLUBLOK)    Return in about 4 weeks (around 11/10/2021), or if symptoms worsen or fail to improve, for In-Clinic Visit.    AUDREY Kong [facility]    Marcellus Soto is a 61 year old who presents for the following health issues     HPI     Depression and Anxiety Follow-Up    How are you doing with your depression since your last visit? Worsened stopped Prozac early summer now getting sx again over the last month    How are you doing with your anxiety since your last visit?  Worsened     Are you having other symptoms that might be associated with depression or anxiety? Yes:  poor appetite    Have you had a significant life event? OTHER: lots of personal issues- los of father     Do you have any concerns with your use of alcohol or other drugs? No    Social History     Tobacco Use     Smoking status: Former Smoker     Packs/day: 1.00     Years: 26.00     Pack years: 26.00      Types: Cigarettes     Quit date: 2002     Years since quittin.4     Smokeless tobacco: Never Used   Vaping Use     Vaping Use: Never used   Substance Use Topics     Alcohol use: Yes     Comment: beer daily in the evening     Drug use: Yes     Types: Marijuana     Comment: rarely     PHQ 3/19/2021 10/13/2021   PHQ-9 Total Score 4 8   Q9: Thoughts of better off dead/self-harm past 2 weeks Not at all Not at all     PROTER-7 SCORE 3/19/2021 10/13/2021   Total Score 5 (mild anxiety) -   Total Score 5 11     Last PHQ-9 10/13/2021   1.  Little interest or pleasure in doing things 1   2.  Feeling down, depressed, or hopeless 1   3.  Trouble falling or staying asleep, or sleeping too much 2   4.  Feeling tired or having little energy 0   5.  Poor appetite or overeating 2   6.  Feeling bad about yourself 1   7.  Trouble concentrating 1   8.  Moving slowly or restless 0   Q9: Thoughts of better off dead/self-harm past 2 weeks 0   PHQ-9 Total Score 8   Difficulty at work, home, or with people Somewhat difficult     PORTER-7  10/13/2021   1. Feeling nervous, anxious, or on edge 3   2. Not being able to stop or control worrying 1   3. Worrying too much about different things 1   4. Trouble relaxing 2   5. Being so restless that it is hard to sit still 1   6. Becoming easily annoyed or irritable 3   7. Feeling afraid, as if something awful might happen 0   PORTER-7 Total Score 11   If you checked any problems, how difficult have they made it for you to do your work, take care of things at home, or get along with other people? Somewhat difficult       Suicide Assessment Five-step Evaluation and Treatment (SAFE-T)    How many servings of fruits and vegetables do you eat daily?  0-1    On average, how many sweetened beverages do you drink each day (Examples: soda, juice, sweet tea, etc.  Do NOT count diet or artificially sweetened beverages)?   2-3 diet beverage    How many days per week do you exercise enough to make your heart  "beat faster? 7    How many minutes a day do you exercise enough to make your heart beat faster? 20 - 29    How many days per week do you miss taking your medication? Stopped Prozac on welbutrin on her own but restarted Wellbutrin 1 week ago.    Review of Systems   See HPI       Objective    /64 (BP Location: Right arm, Patient Position: Chair, Cuff Size: Adult Regular)   Pulse 75   Temp 98.1  F (36.7  C) (Tympanic)   Resp 16   Ht 1.791 m (5' 10.5\")   Wt 75.4 kg (166 lb 3.2 oz)   LMP 05/04/2002   SpO2 98%   Breastfeeding No   BMI 23.51 kg/m    Body mass index is 23.51 kg/m .  Physical Exam   Constitutional: healthy, alert, and no distress  Head: Normocephalic. Atraumatic  Eyes: No conjunctival injection, sclera anicteric  Respiratory: No resp distress.  Musculoskeletal: extremities normal- no gross deformities noted, and normal muscle tone  Neurologic: Gait normal. CN 2-12 grossly intact  Psychiatric: mentation appears normal and affect normal/bright         "

## 2021-10-14 ASSESSMENT — ANXIETY QUESTIONNAIRES: GAD7 TOTAL SCORE: 11

## 2021-10-24 ENCOUNTER — HEALTH MAINTENANCE LETTER (OUTPATIENT)
Age: 61
End: 2021-10-24

## 2022-01-20 ENCOUNTER — VIRTUAL VISIT (OUTPATIENT)
Dept: FAMILY MEDICINE | Facility: CLINIC | Age: 62
End: 2022-01-20
Payer: COMMERCIAL

## 2022-01-20 ENCOUNTER — TELEPHONE (OUTPATIENT)
Dept: FAMILY MEDICINE | Facility: CLINIC | Age: 62
End: 2022-01-20
Payer: COMMERCIAL

## 2022-01-20 DIAGNOSIS — F33.1 MODERATE EPISODE OF RECURRENT MAJOR DEPRESSIVE DISORDER (H): Primary | ICD-10-CM

## 2022-01-20 DIAGNOSIS — F10.21 ALCOHOLISM IN REMISSION (H): ICD-10-CM

## 2022-01-20 PROCEDURE — 99214 OFFICE O/P EST MOD 30 MIN: CPT | Mod: GT | Performed by: PHYSICIAN ASSISTANT

## 2022-01-20 RX ORDER — AMITRIPTYLINE HYDROCHLORIDE 10 MG/1
10 TABLET ORAL AT BEDTIME
Qty: 30 TABLET | Refills: 1 | Status: SHIPPED | OUTPATIENT
Start: 2022-01-20 | End: 2022-02-03

## 2022-01-20 ASSESSMENT — PATIENT HEALTH QUESTIONNAIRE - PHQ9
SUM OF ALL RESPONSES TO PHQ QUESTIONS 1-9: 16
5. POOR APPETITE OR OVEREATING: NEARLY EVERY DAY

## 2022-01-20 ASSESSMENT — ANXIETY QUESTIONNAIRES
6. BECOMING EASILY ANNOYED OR IRRITABLE: NEARLY EVERY DAY
GAD7 TOTAL SCORE: 16
2. NOT BEING ABLE TO STOP OR CONTROL WORRYING: NEARLY EVERY DAY
5. BEING SO RESTLESS THAT IT IS HARD TO SIT STILL: NEARLY EVERY DAY
1. FEELING NERVOUS, ANXIOUS, OR ON EDGE: NEARLY EVERY DAY
3. WORRYING TOO MUCH ABOUT DIFFERENT THINGS: SEVERAL DAYS
7. FEELING AFRAID AS IF SOMETHING AWFUL MIGHT HAPPEN: NOT AT ALL

## 2022-01-20 NOTE — PROGRESS NOTES
Brittany is a 61 year old who is being evaluated via a billable video visit.      How would you like to obtain your AVS? MyChart  If the video visit is dropped, the invitation should be resent by: Text to cell phone: [phone]  Will anyone else be joining your video visit? No    Video Start Time: 3:35 PM    Assessment & Plan   Moderate episode of recurrent major depressive disorder (H)  Significant worsening due to a cancer diagnosis in her son. Discussed multiple options for treatment of worsening depression/grief reaction, patient made the informed decision to try Amitriptyline. She has tried this in the past and had worked well for her. Encouraged to try an as needed medication, but patient deferred at this time. Follow-up in 2 weeks for recheck.   - amitriptyline (ELAVIL) 10 MG tablet; Take 1 tablet (10 mg) by mouth At Bedtime    Alcoholism in remission (H)  Stable. No recent use. Encouraged ongoing cessation amid this difficult time for her.      Depression Screening Follow Up  PHQ 1/20/2022   PHQ-9 Total Score 16   Q9: Thoughts of better off dead/self-harm past 2 weeks Several days     Follow Up      Follow Up Actions Taken  Crisis resource information provided in the After Visit Summary  Patient to follow up with PCP.  Clinic staff to schedule appointment if able.    Discussed the following ways the patient can remain in a safe environment:  remove alcohol and be around others    Return in about 2 weeks (around 2/3/2022) for Video Visit, Depression recheck.    Juvenal Rascon PA-C  [facility]   Brittany is a 61 year old who presents for the following health issues    HPI   Anxiety Follow-Up    How are you doing with your anxiety since your last visit? Worsened     Are you having other symptoms that might be associated with anxiety? No    Have you had a significant life event? YES     Are you feeling depressed? Yes:  .    Do you have any concerns with your use of alcohol  or other drugs? No    Social History     Tobacco Use     Smoking status: Former Smoker     Packs/day: 1.00     Years: 26.00     Pack years: 26.00     Types: Cigarettes     Quit date: 2002     Years since quittin.7     Smokeless tobacco: Never Used   Vaping Use     Vaping Use: Never used   Substance Use Topics     Alcohol use: Yes     Comment: beer daily in the evening     Drug use: Yes     Types: Marijuana     Comment: rarely     PORTER-7 SCORE 3/19/2021 10/13/2021 2022   Total Score 5 (mild anxiety) - -   Total Score 5 11 16     PHQ 3/19/2021 10/13/2021 2022   PHQ-9 Total Score 4 8 16   Q9: Thoughts of better off dead/self-harm past 2 weeks Not at all Not at all Several days   Passive SI. No plan. Grief reaction.     Review of Systems   See HPI       Objective       Vitals:  No vitals were obtained today due to virtual visit.    Physical Exam   GENERAL: Healthy, alert and no distress  EYES: Eyes grossly normal to inspection.  No discharge or erythema, or obvious scleral/conjunctival abnormalities.  RESP: No audible wheeze, cough, or visible cyanosis.  No visible retractions or increased work of breathing.    SKIN: Visible skin clear. No significant rash, abnormal pigmentation or lesions.  NEURO: Cranial nerves grossly intact.  Mentation and speech appropriate for age.  PSYCH: Mentation appears normal, affect normal/bright, judgement and insight intact, normal speech and appearance well-groomed.        Video-Visit Details    Type of service:  Video Visit    Video End Time:3:53 PM    Originating Location (pt. Location): Home    Distant Location (provider location):  [facility]     Platform used for Video Visit: Feastie

## 2022-01-20 NOTE — TELEPHONE ENCOUNTER
Call placed to patient as medication requested is not active. She is having increased anxiety and difficulty sleeping due to circumstances at home.  She stated she had some Amitriptyline left over and it was helping.   She is tearful but does not feel like harming self.  Appointment in scheduled

## 2022-01-20 NOTE — TELEPHONE ENCOUNTER
Reason for Call:  Other prescription    Detailed comments: Faxed request from [facility] -  Amitriptylin 10 mg take one tablet by mouth at bedtime can increase to 2 tabs in 3 nights and 3 tabs in 3 nights if needed.    Please Advise as this is new to current med list.      Call taken on 1/20/2022 at 11:16 AM by Silke Yoo

## 2022-01-21 ASSESSMENT — ANXIETY QUESTIONNAIRES: GAD7 TOTAL SCORE: 16

## 2022-01-24 ENCOUNTER — TELEPHONE (OUTPATIENT)
Dept: FAMILY MEDICINE | Facility: CLINIC | Age: 62
End: 2022-01-24
Payer: COMMERCIAL

## 2022-01-24 DIAGNOSIS — F43.21 GRIEF REACTION: ICD-10-CM

## 2022-01-24 DIAGNOSIS — F33.1 MODERATE EPISODE OF RECURRENT MAJOR DEPRESSIVE DISORDER (H): Primary | ICD-10-CM

## 2022-01-24 RX ORDER — LORAZEPAM 0.5 MG/1
0.5 TABLET ORAL EVERY 12 HOURS PRN
Qty: 20 TABLET | Refills: 0 | Status: SHIPPED | OUTPATIENT
Start: 2022-01-24 | End: 2022-02-03

## 2022-01-24 NOTE — TELEPHONE ENCOUNTER
Patient notified. Patient took a hydroxyzine and is wondering if she can still take the ativan. Informed she can, monitor for groggy, sleepy side effects. Advised to avoid alcohol. Monitor how she feels after taking. Don't drive right away until she knows how her body responds to the ativan. Beba NEWELL RN

## 2022-01-24 NOTE — TELEPHONE ENCOUNTER
Reason for Call:  Other prescription    Detailed comments: Ativan - Pt had a Virtual Visit 1/20/22. Pt said after this weekend with Panic attacks pt would like to go the Route Juvenal Rascon PA-C suggested with Ativan     Phone Number Patient can be reached at: Home number on file [phone] (home)    Best Time: Any Time      Can we leave a detailed message on this number? YES    Call taken on 1/24/2022 at 8:53 AM by Silke Yoo

## 2022-01-25 NOTE — PATIENT INSTRUCTIONS
I am sorry to hear about the difficult news.     Lets try Amitriptyline.     Work on stress reduction as much as you can.     Follow-up in 2 weeks to see how things are going.

## 2022-02-03 ENCOUNTER — VIRTUAL VISIT (OUTPATIENT)
Dept: FAMILY MEDICINE | Facility: CLINIC | Age: 62
End: 2022-02-03
Payer: COMMERCIAL

## 2022-02-03 DIAGNOSIS — F33.1 MODERATE EPISODE OF RECURRENT MAJOR DEPRESSIVE DISORDER (H): ICD-10-CM

## 2022-02-03 DIAGNOSIS — F43.21 GRIEF REACTION: Primary | ICD-10-CM

## 2022-02-03 PROCEDURE — 99214 OFFICE O/P EST MOD 30 MIN: CPT | Mod: GT | Performed by: PHYSICIAN ASSISTANT

## 2022-02-03 RX ORDER — ALPRAZOLAM 0.5 MG
0.5 TABLET ORAL 3 TIMES DAILY PRN
Qty: 20 TABLET | Refills: 0 | Status: SHIPPED | OUTPATIENT
Start: 2022-02-03 | End: 2022-02-17

## 2022-02-03 ASSESSMENT — ANXIETY QUESTIONNAIRES
6. BECOMING EASILY ANNOYED OR IRRITABLE: MORE THAN HALF THE DAYS
5. BEING SO RESTLESS THAT IT IS HARD TO SIT STILL: NEARLY EVERY DAY
GAD7 TOTAL SCORE: 14
3. WORRYING TOO MUCH ABOUT DIFFERENT THINGS: NOT AT ALL
2. NOT BEING ABLE TO STOP OR CONTROL WORRYING: NEARLY EVERY DAY
1. FEELING NERVOUS, ANXIOUS, OR ON EDGE: NEARLY EVERY DAY
7. FEELING AFRAID AS IF SOMETHING AWFUL MIGHT HAPPEN: NOT AT ALL

## 2022-02-03 ASSESSMENT — PATIENT HEALTH QUESTIONNAIRE - PHQ9
SUM OF ALL RESPONSES TO PHQ QUESTIONS 1-9: 14
5. POOR APPETITE OR OVEREATING: NEARLY EVERY DAY

## 2022-02-03 NOTE — PROGRESS NOTES
Brittany is a 61 year old who is being evaluated via a billable video visit.      How would you like to obtain your AVS? MyChart  If the video visit is dropped, the invitation should be resent by: Text to cell phone: [phone]  Will anyone else be joining your video visit? No    Video Start Time: 3:25 PM    Assessment & Plan   Grief reaction  Did not tolerate Ativan very well. This upset her stomach. She did not want to try amitriptyline because she is nervous about side effects. She has tolerated Xanax in the past. She has a significant life stressor and grief reaction to the diagnosis of cancer in her son. I think is reasonable to try Xanax. Patient will follow up as needed for recurrent or persistent symptoms.  - ALPRAZolam (XANAX) 0.5 MG tablet; Take 1 tablet (0.5 mg) by mouth 3 times daily as needed for anxiety    Moderate episode of recurrent major depressive disorder (H)  Obviously worse since his diagnosis of cancer in her son. We will try Xanax as above. Continue Wellbutrin and hydroxyzine as previously prescribed.       Depression Screening Follow Up  PHQ 2/3/2022   PHQ-9 Total Score 14   Q9: Thoughts of better off dead/self-harm past 2 weeks Not at all     Last PHQ-9 2/3/2022   1.  Little interest or pleasure in doing things 2   2.  Feeling down, depressed, or hopeless 2   3.  Trouble falling or staying asleep, or sleeping too much 3   4.  Feeling tired or having little energy 1   5.  Poor appetite or overeating 3   6.  Feeling bad about yourself 0   7.  Trouble concentrating 3   8.  Moving slowly or restless 0   Q9: Thoughts of better off dead/self-harm past 2 weeks 0   PHQ-9 Total Score 14   Difficulty at work, home, or with people -       Follow Up Actions Taken    No follow-ups on file.    AUDREY Kong [facility]    Subjective   Brittany is a 61 year old who presents for the following health issues     HPI     Anxiety Follow-Up    How are you doing with your anxiety  since your last visit? Worse in the medication(lorazepam) that she tried upset her stomach    Are you having other symptoms that might be associated with anxiety? No    Have you had a significant life event? OTHER: Son was just diagnosed with stage 4 cancer      Are you feeling depressed? Yes:  Says that these are the same as before     Do you have any concerns with your use of alcohol or other drugs? No    Social History     Tobacco Use     Smoking status: Former Smoker     Packs/day: 1.00     Years: 26.00     Pack years: 26.00     Types: Cigarettes     Quit date: 2002     Years since quittin.7     Smokeless tobacco: Never Used   Vaping Use     Vaping Use: Never used   Substance Use Topics     Alcohol use: Yes     Comment: beer daily in the evening     Drug use: Yes     Types: Marijuana     Comment: rarely     PORTER-7 SCORE 3/19/2021 10/13/2021 2022   Total Score 5 (mild anxiety) - -   Total Score 5 11 16     PHQ 3/19/2021 10/13/2021 2022   PHQ-9 Total Score 4 8 16   Q9: Thoughts of better off dead/self-harm past 2 weeks Not at all Not at all Several days     Last PHQ-9 2/3/2022   1.  Little interest or pleasure in doing things 2   2.  Feeling down, depressed, or hopeless 2   3.  Trouble falling or staying asleep, or sleeping too much 3   4.  Feeling tired or having little energy 1   5.  Poor appetite or overeating 3   6.  Feeling bad about yourself 0   7.  Trouble concentrating 3   8.  Moving slowly or restless 0   Q9: Thoughts of better off dead/self-harm past 2 weeks 0   PHQ-9 Total Score 14   Difficulty at work, home, or with people -     PORTER-7  2/3/2022   1. Feeling nervous, anxious, or on edge 3   2. Not being able to stop or control worrying 3   3. Worrying too much about different things 0   4. Trouble relaxing 3   5. Being so restless that it is hard to sit still 3   6. Becoming easily annoyed or irritable 2   7. Feeling afraid, as if something awful might happen 0   PORTER-7 Total Score 14    If you checked any problems, how difficult have they made it for you to do your work, take care of things at home, or get along with other people? -         How many servings of fruits and vegetables do you eat daily?  2-3    On average, how many sweetened beverages do you drink each day (Examples: soda, juice, sweet tea, etc.  Do NOT count diet or artificially sweetened beverages)?   0    How many days per week do you exercise enough to make your heart beat faster? 3 or less    How many minutes a day do you exercise enough to make your heart beat faster? 9 or less    How many days per week do you miss taking your medication? 0    Review of Systems   See HPI      Objective       Vitals:  No vitals were obtained today due to virtual visit.    Physical Exam   GENERAL: Healthy, alert and no distress  EYES: Eyes grossly normal to inspection.  No discharge or erythema, or obvious scleral/conjunctival abnormalities.  RESP: No audible wheeze, cough, or visible cyanosis.  No visible retractions or increased work of breathing.    SKIN: Visible skin clear. No significant rash, abnormal pigmentation or lesions.  NEURO: Cranial nerves grossly intact.  Mentation and speech appropriate for age.  PSYCH: Mentation appears normal, affect depressed and tearful, judgement and insight intact, normal speech and appearance well-groomed.          Video-Visit Details    Type of service:  Video Visit    Video End Time:3:30 PM    Originating Location (pt. Location): Home    Distant Location (provider location):  [facility]     Platform used for Video Visit: Apprema

## 2022-02-04 ASSESSMENT — ANXIETY QUESTIONNAIRES: GAD7 TOTAL SCORE: 14

## 2022-02-17 ENCOUNTER — OFFICE VISIT (OUTPATIENT)
Dept: FAMILY MEDICINE | Facility: CLINIC | Age: 62
End: 2022-02-17
Payer: COMMERCIAL

## 2022-02-17 VITALS
BODY MASS INDEX: 23.27 KG/M2 | HEIGHT: 71 IN | HEART RATE: 81 BPM | WEIGHT: 166.2 LBS | SYSTOLIC BLOOD PRESSURE: 118 MMHG | RESPIRATION RATE: 22 BRPM | OXYGEN SATURATION: 97 % | TEMPERATURE: 98.5 F | DIASTOLIC BLOOD PRESSURE: 80 MMHG

## 2022-02-17 DIAGNOSIS — N64.4 BREAST PAIN: ICD-10-CM

## 2022-02-17 DIAGNOSIS — Z12.11 SCREEN FOR COLON CANCER: ICD-10-CM

## 2022-02-17 DIAGNOSIS — F43.21 GRIEF REACTION: ICD-10-CM

## 2022-02-17 DIAGNOSIS — Z23 HIGH PRIORITY FOR 2019-NCOV VACCINE: ICD-10-CM

## 2022-02-17 DIAGNOSIS — F33.1 MODERATE EPISODE OF RECURRENT MAJOR DEPRESSIVE DISORDER (H): Primary | ICD-10-CM

## 2022-02-17 PROCEDURE — 91306 COVID-19,PF,MODERNA (18+ YRS BOOSTER .25ML): CPT | Performed by: PHYSICIAN ASSISTANT

## 2022-02-17 PROCEDURE — 99215 OFFICE O/P EST HI 40 MIN: CPT | Performed by: PHYSICIAN ASSISTANT

## 2022-02-17 PROCEDURE — 0064A COVID-19,PF,MODERNA (18+ YRS BOOSTER .25ML): CPT | Performed by: PHYSICIAN ASSISTANT

## 2022-02-17 RX ORDER — CITALOPRAM HYDROBROMIDE 10 MG/1
10 TABLET ORAL DAILY
Qty: 30 TABLET | Refills: 1 | Status: SHIPPED | OUTPATIENT
Start: 2022-02-17 | End: 2022-03-16

## 2022-02-17 RX ORDER — ALPRAZOLAM 0.5 MG
0.5 TABLET ORAL 3 TIMES DAILY PRN
Qty: 20 TABLET | Refills: 0 | Status: SHIPPED | OUTPATIENT
Start: 2022-02-17 | End: 2022-03-16

## 2022-02-17 ASSESSMENT — PATIENT HEALTH QUESTIONNAIRE - PHQ9
SUM OF ALL RESPONSES TO PHQ QUESTIONS 1-9: 13
10. IF YOU CHECKED OFF ANY PROBLEMS, HOW DIFFICULT HAVE THESE PROBLEMS MADE IT FOR YOU TO DO YOUR WORK, TAKE CARE OF THINGS AT HOME, OR GET ALONG WITH OTHER PEOPLE: VERY DIFFICULT
SUM OF ALL RESPONSES TO PHQ QUESTIONS 1-9: 13

## 2022-02-17 NOTE — PROGRESS NOTES
Assessment & Plan   Moderate episode of recurrent major depressive disorder (H)  Xanax has been very helpful to her during this difficult time but she is interested in using something all day.  Shared decision making was discussed today and the patient elected to start Celexa.  She will do this and will recheck in 1 month for reevaluation.  - citalopram (CELEXA) 10 MG tablet; Take 1 tablet (10 mg) by mouth daily    Grief reaction  Xanax has been very helpful.  We discussed short course of this medicine and it is a temporary medicine until we can get her on a better course of coping and or medicine.  Refilled Xanax today.  Start Celexa as above and follow-up in 1 month for recheck  - citalopram (CELEXA) 10 MG tablet; Take 1 tablet (10 mg) by mouth daily  - ALPRAZolam (XANAX) 0.5 MG tablet; Take 1 tablet (0.5 mg) by mouth 3 times daily as needed for anxiety    Breast pain  History of left lumpectomy with lymph node biopsy.  Family history of breast cancer as well.  She notes some abnormal sensations near her previous surgical scar.  Breast exam today was unremarkable for obvious lump or change.  We will repeat a diagnostic mammogram today and the patient will follow up after results.  - MA Diagnostic Digital Bilateral; Future    High priority for 2019-nCoV vaccine  Pt requested Booster shot for Covid. Discussed that this can lead to a false positive on her mammogram. Pt will wait at least 2 weeks to get her mammogram done.   - COVID-19,PF,MODERNA (18+ Yrs BOOSTER .25mL)    Screen for colon cancer  Due for screening. Ordered today.   - Adult Gastro Ref - Procedure Only; Future    Return in about 4 weeks (around 3/17/2022) for Depression recheck, In-Clinic Visit.    I spent a total of 60 minutes on the day of the visit.   Time spent doing chart review, history and exam, documentation and further activities per the note    Juvenal Rascon PA-C  [facility]    Marcellus Soto is a 61 year  "old who presents for the following health issues     HPI   Chief Complaint   Patient presents with     Breast Exam     patient is having a \"weird\" sensation in her left breast, so she is requesting an exam     Mental Health:   Xanax has been helpful. More irritable. Is interested in other medicines though.     Review of Systems   See HPI       Objective    /80 (BP Location: Right arm, Patient Position: Sitting, Cuff Size: Adult Regular)   Pulse 81   Temp 98.5  F (36.9  C) (Tympanic)   Resp 22   Ht 1.791 m (5' 10.5\")   Wt 75.4 kg (166 lb 3.2 oz)   LMP 05/04/2002   SpO2 97%   Breastfeeding No   BMI 23.51 kg/m    Body mass index is 23.51 kg/m .  Physical Exam   GENERAL: healthy, alert and no distress  EYES: Eyes grossly normal to inspection, PERRL and conjunctivae and sclerae normal  BREAST: normal without masses, tenderness or nipple discharge, no palpable axillary masses or adenopathy and well healed surgical incision with scar tissue formation along the L upper breast. TERRI Leon was present for exam.   MS: no gross musculoskeletal defects noted, no edema  SKIN: no suspicious lesions or rashes  NEURO: Normal strength and tone, mentation intact and speech normal  PSYCH: mentation appears normal, affect normal/bright        "

## 2022-02-17 NOTE — PATIENT INSTRUCTIONS
Start Celexa. Continue Xanax.     Covid Booster today. No mammogram for at least     Follow-up with me in 4 weeks.

## 2022-03-16 ENCOUNTER — E-VISIT (OUTPATIENT)
Dept: FAMILY MEDICINE | Facility: CLINIC | Age: 62
End: 2022-03-16
Payer: COMMERCIAL

## 2022-03-16 DIAGNOSIS — F43.21 GRIEF REACTION: ICD-10-CM

## 2022-03-16 DIAGNOSIS — F33.1 MODERATE EPISODE OF RECURRENT MAJOR DEPRESSIVE DISORDER (H): ICD-10-CM

## 2022-03-16 PROCEDURE — 99421 OL DIG E/M SVC 5-10 MIN: CPT | Performed by: PHYSICIAN ASSISTANT

## 2022-03-16 RX ORDER — CITALOPRAM HYDROBROMIDE 20 MG/1
20 TABLET ORAL DAILY
Qty: 90 TABLET | Refills: 3 | Status: SHIPPED | OUTPATIENT
Start: 2022-03-16 | End: 2022-06-27 | Stop reason: DRUGHIGH

## 2022-03-16 RX ORDER — ALPRAZOLAM 0.5 MG
0.5 TABLET ORAL DAILY PRN
Qty: 20 TABLET | Refills: 0 | Status: SHIPPED | OUTPATIENT
Start: 2022-03-16 | End: 2022-10-13

## 2022-03-16 ASSESSMENT — ANXIETY QUESTIONNAIRES
6. BECOMING EASILY ANNOYED OR IRRITABLE: SEVERAL DAYS
2. NOT BEING ABLE TO STOP OR CONTROL WORRYING: NEARLY EVERY DAY
GAD7 TOTAL SCORE: 16
3. WORRYING TOO MUCH ABOUT DIFFERENT THINGS: MORE THAN HALF THE DAYS
7. FEELING AFRAID AS IF SOMETHING AWFUL MIGHT HAPPEN: NEARLY EVERY DAY
GAD7 TOTAL SCORE: 16
7. FEELING AFRAID AS IF SOMETHING AWFUL MIGHT HAPPEN: NEARLY EVERY DAY
5. BEING SO RESTLESS THAT IT IS HARD TO SIT STILL: SEVERAL DAYS
4. TROUBLE RELAXING: NEARLY EVERY DAY
1. FEELING NERVOUS, ANXIOUS, OR ON EDGE: NEARLY EVERY DAY
GAD7 TOTAL SCORE: 16
8. IF YOU CHECKED OFF ANY PROBLEMS, HOW DIFFICULT HAVE THESE MADE IT FOR YOU TO DO YOUR WORK, TAKE CARE OF THINGS AT HOME, OR GET ALONG WITH OTHER PEOPLE?: EXTREMELY DIFFICULT

## 2022-03-16 ASSESSMENT — PATIENT HEALTH QUESTIONNAIRE - PHQ9
10. IF YOU CHECKED OFF ANY PROBLEMS, HOW DIFFICULT HAVE THESE PROBLEMS MADE IT FOR YOU TO DO YOUR WORK, TAKE CARE OF THINGS AT HOME, OR GET ALONG WITH OTHER PEOPLE: VERY DIFFICULT
SUM OF ALL RESPONSES TO PHQ QUESTIONS 1-9: 17
SUM OF ALL RESPONSES TO PHQ QUESTIONS 1-9: 17

## 2022-03-16 NOTE — PATIENT INSTRUCTIONS
Depression: Tips to Help Yourself    As your healthcare providers help treat your depression, you can also help yourself. Keep in mind that your illness affects you emotionally, physically, mentally, and socially. So full recovery will take time. Take care of your body and your soul, and be patient with yourself as you get better.  Self-care    Educate yourself. Read about treatment and medicine options. If you have the energy, attend local conferences or support groups. Keep a list of useful websites and helpful books and use them as needed. This illness is not your fault. Don t blame yourself for your depression.    Manage early symptoms. If you notice symptoms returning, experience triggers, or identify other factors that may lead to a depressive episode, get help as soon as possible. Ask trusted friends and family to monitor your behavior and let you know if they see anything of concern.    Work with your provider. Find a provider you can trust. Communicate honestly with that person and share information on your treatment for depression and your reaction to medicines.    Be prepared for a crisis. Know what to do if you experience a crisis. Keep the phone number of a crisis hotline and know the location of your community's urgent care centers and the closest emergency department.    Hold off on big decisions. Depression can cloud your judgment. So wait until you feel better before making major life decisions, such as changing jobs, moving, or getting  or .    Be patient. Recovering from depression is a process. Don t be discouraged if it takes some time to feel better.    Keep it simple. Depression saps your energy and concentration. So you won t be able to do all the things you used to do. Set small goals and do what you can.    Be with others. Don t isolate yourself--you ll only feel worse. Try to be with other people. And take part in fun activities when you can. Go to a movie, ballgame,  Nondenominational service, or social event. Talk openly with people you can trust. And accept help when it s offered.    Take care of your body  People with depression often lose the desire to take care of themselves. That only makes their problems worse. During treatment and afterward, make a point to:    Exercise. It s a great way to take care of your body. And studies have shown that exercise helps fight depression. Aim for 30 minutes of moderate activity a day. Walking in small blocks of time (5-10 minutes) is a good way to start, but anything that gets you moving (gardening, house cleaning) counts.    Don't use drugs and alcohol. These may ease the pain in the short term. But they ll only make your problems worse in the long run.    Get relief from stress. Ask your healthcare provider for relaxation exercises and techniques to help relieve stress. Consider activities like meditation, yoga, or Justen Chi.    Eat right. A balanced and healthy diet helps keep your body healthy.    Get adequate sleep. Aim for 8 hours per night. Too much or too little sleep can cause other physical and emotional problems.  [employer] last reviewed this educational content on 12/1/2019 2000-2021 [employer]. All rights reserved. This information is not intended as a substitute for professional medical care. Always follow your healthcare professional's instructions.

## 2022-03-17 ASSESSMENT — PATIENT HEALTH QUESTIONNAIRE - PHQ9: SUM OF ALL RESPONSES TO PHQ QUESTIONS 1-9: 17

## 2022-03-17 ASSESSMENT — ANXIETY QUESTIONNAIRES: GAD7 TOTAL SCORE: 16

## 2022-04-01 ENCOUNTER — E-VISIT (OUTPATIENT)
Dept: FAMILY MEDICINE | Facility: CLINIC | Age: 62
End: 2022-04-01
Payer: COMMERCIAL

## 2022-04-01 DIAGNOSIS — R35.0 URINARY FREQUENCY: ICD-10-CM

## 2022-04-01 DIAGNOSIS — R30.0 DIFFICULT OR PAINFUL URINATION: Primary | ICD-10-CM

## 2022-04-01 PROCEDURE — 99421 OL DIG E/M SVC 5-10 MIN: CPT | Performed by: PHYSICIAN ASSISTANT

## 2022-04-01 NOTE — PATIENT INSTRUCTIONS
Dear Brittany Michael,     After reviewing your responses, I would like you to come in for a urine test to make sure we treat you correctly. This urine test is to evaluate you for a possible urinary tract infection, and should be scheduled for today or tomorrow. Schedule a Lab Only appointment here.     Lab appointments are not available at most locations on the weekends. If no Lab Only appointment is available, you should be seen in any of our convenient Walk-in or Urgent Care Centers, which can be found on our website here.     You will receive instructions with your results in [employer] once they are available.     If your symptoms worsen, you develop pain in your back or stomach, develop fevers, or are not improving in 5 days, please contact your primary care provider for an appointment or visit a Walk-in or Urgent Care Center to be seen.     Thanks again for choosing us as your health care partner,     Juvenal Rascon PA-C    Dysuria with Uncertain Cause (Adult)    The urethra is the tube that allows urine to pass out of the body. In a woman, the urethra is the opening above the vagina. In men, the urethra is the opening on the tip of the penis. Dysuria is the feeling of pain or burning in the urethra when passing urine.   Dysuria can be caused by anything that irritates or inflames the urethra. An infection or chemical irritation can cause this reaction. A bladder infection is the most common cause of dysuria in adults. A urine test can diagnose this. A bladder infection needs antibiotic treatment.   Soaps, lotions, colognes, and feminine hygiene products can cause dysuria. So can birth control jellies, creams, and foams. It will go away 1 to 3 days after using these irritants.   Sexually transmitted infections (STIs) such as chlamydia or gonorrhea can cause dysuria. Your healthcare provider may take a culture sample. Your provider may start you on antibiotic medicine before the culture test returns.   In  women who have gone through menopause, dysuria can be from dryness in the lining of the urethra. This can be treated with hormones. Dysuria becomes long-term (chronic) when it lasts for weeks or months. You may need to see a specialist (urologist) to diagnose and treat chronic dysuria.   Home care  These home care tips may help:    Don't use any chemicals or products that you think may be causing your symptoms.    If you were given a prescription medicine, take as directed. Take it until it is all used up.    If a culture was taken, don't have sex until you have been told that it is negative. A negative culture means you don't have an infection. Then follow your healthcare provider's advice to treat your condition.  If a culture was done and it is positive:     Both you and your sexual partner may need to be treated. This is true even if your partner has no symptoms.    Contact your healthcare provider or go to an urgent care clinic or the public health department to be looked at and treated.    Don't have sex until both you and your partner have finished all antibiotics and your healthcare provider says you are no longer contagious.    Learn about and use safe sex practices. The safest sex is with a partner who has tested negative and only has sex with you. Condoms can prevent STIs from spreading, but they aren't a guarantee.  Follow-up care  Follow up with your healthcare provider, or as advised. If a culture was taken, you may call as directed for the results. If you have an STI, follow up with your provider or the public health department for a complete STI screening, including HIV testing. For more information, contact CDC-INFO at 341-025-6642.   When to get medical advice  Call your healthcare provider right away if any of these occur:    You aren't better after 3 days of treatment    Fever of 100.4 F (38 C) or higher, or as directed by your provider    Back or belly pain that gets worse    You can't urinate  because of pain    New discharge from the urethra, vagina, or penis    Painful sores on the penis    Rash or joint pain    Painful lumps (lymph nodes) in the groin    Testicle pain or swelling of the scrotum  [employer]en last reviewed this educational content on 10/1/2019    7216-9199 [employer]. All rights reserved. This information is not intended as a substitute for professional medical care. Always follow your healthcare professional's instructions.

## 2022-04-07 ENCOUNTER — HOSPITAL ENCOUNTER (OUTPATIENT)
Facility: CLINIC | Age: 62
End: 2022-04-07
Attending: SURGERY | Admitting: SURGERY
Payer: COMMERCIAL

## 2022-04-16 ENCOUNTER — MYC MEDICAL ADVICE (OUTPATIENT)
Dept: FAMILY MEDICINE | Facility: CLINIC | Age: 62
End: 2022-04-16
Payer: COMMERCIAL

## 2022-05-24 DIAGNOSIS — Z11.59 ENCOUNTER FOR SCREENING FOR OTHER VIRAL DISEASES: Primary | ICD-10-CM

## 2022-06-05 ENCOUNTER — HEALTH MAINTENANCE LETTER (OUTPATIENT)
Age: 62
End: 2022-06-05

## 2022-06-27 ENCOUNTER — OFFICE VISIT (OUTPATIENT)
Dept: FAMILY MEDICINE | Facility: CLINIC | Age: 62
End: 2022-06-27
Payer: COMMERCIAL

## 2022-06-27 VITALS
HEART RATE: 66 BPM | BODY MASS INDEX: 22.24 KG/M2 | SYSTOLIC BLOOD PRESSURE: 116 MMHG | RESPIRATION RATE: 18 BRPM | TEMPERATURE: 98.3 F | WEIGHT: 157.2 LBS | DIASTOLIC BLOOD PRESSURE: 78 MMHG

## 2022-06-27 DIAGNOSIS — F33.1 MODERATE EPISODE OF RECURRENT MAJOR DEPRESSIVE DISORDER (H): Primary | ICD-10-CM

## 2022-06-27 DIAGNOSIS — M25.50 POLYARTHRALGIA: ICD-10-CM

## 2022-06-27 PROCEDURE — 99214 OFFICE O/P EST MOD 30 MIN: CPT | Performed by: PHYSICIAN ASSISTANT

## 2022-06-27 RX ORDER — CELECOXIB 200 MG/1
200 CAPSULE ORAL 2 TIMES DAILY PRN
Qty: 180 CAPSULE | Refills: 3 | Status: SHIPPED | OUTPATIENT
Start: 2022-06-27 | End: 2023-08-28

## 2022-06-27 ASSESSMENT — ANXIETY QUESTIONNAIRES
GAD7 TOTAL SCORE: 12
5. BEING SO RESTLESS THAT IT IS HARD TO SIT STILL: NEARLY EVERY DAY
7. FEELING AFRAID AS IF SOMETHING AWFUL MIGHT HAPPEN: NOT AT ALL
GAD7 TOTAL SCORE: 12
1. FEELING NERVOUS, ANXIOUS, OR ON EDGE: NEARLY EVERY DAY
2. NOT BEING ABLE TO STOP OR CONTROL WORRYING: SEVERAL DAYS
3. WORRYING TOO MUCH ABOUT DIFFERENT THINGS: SEVERAL DAYS
8. IF YOU CHECKED OFF ANY PROBLEMS, HOW DIFFICULT HAVE THESE MADE IT FOR YOU TO DO YOUR WORK, TAKE CARE OF THINGS AT HOME, OR GET ALONG WITH OTHER PEOPLE?: VERY DIFFICULT
7. FEELING AFRAID AS IF SOMETHING AWFUL MIGHT HAPPEN: NOT AT ALL
GAD7 TOTAL SCORE: 12
4. TROUBLE RELAXING: NEARLY EVERY DAY
6. BECOMING EASILY ANNOYED OR IRRITABLE: SEVERAL DAYS

## 2022-06-27 ASSESSMENT — PATIENT HEALTH QUESTIONNAIRE - PHQ9
SUM OF ALL RESPONSES TO PHQ QUESTIONS 1-9: 9
SUM OF ALL RESPONSES TO PHQ QUESTIONS 1-9: 9
10. IF YOU CHECKED OFF ANY PROBLEMS, HOW DIFFICULT HAVE THESE PROBLEMS MADE IT FOR YOU TO DO YOUR WORK, TAKE CARE OF THINGS AT HOME, OR GET ALONG WITH OTHER PEOPLE: VERY DIFFICULT

## 2022-06-27 ASSESSMENT — PAIN SCALES - GENERAL: PAINLEVEL: NO PAIN (0)

## 2022-06-27 NOTE — PROGRESS NOTES
Assessment & Plan   Moderate episode of recurrent major depressive disorder (H)  Overall symptoms have been improving since we started her on medication.  She has tolerated Wellbutrin and as needed Xanax well.  We have been adjusting her SSRI regimen.  She has recently started Prozac and was supposed to stop Celexa but she did not.  She notes actually good improvement while taking both of these medicines.  We discussed that she should only be on 1 SSRI at a time so with her more significant improvement with Prozac we will stop Celexa and increase the dose of Prozac.  Titrate to 60 mg daily.  Recommend follow-up in 3 months if symptoms are well controlled or sooner if needed.  Continue to use Xanax very sparingly and I do think it is reasonable given her current stress to allow for 20 tablets over 6-month period of time.  - FLUoxetine (PROZAC) 20 MG capsule; Take 2 capsules (40 mg) by mouth daily for 14 days, THEN 3 capsules (60 mg) daily for 76 days.    Polyarthralgia  Well-controlled on Celebrex.  She has not had formal evaluation of her polyarthralgias at least according to her [facility] chart.  She notes morning stiffness when she does not take Celebrex but then it improves.  I discussed the possibility of an autoimmune type of arthritis and that this leads to joint destruction that we can prevent with better medicine.  Overall patient is not ready for this work-up but will present when she is.  Refilled Celebrex which she will continue today.  - celecoxib (CELEBREX) 200 MG capsule; Take 1 capsule (200 mg) by mouth 2 times daily as needed for moderate pain    Return in about 3 months (around 9/27/2022) for In-Clinic Visit.    AUDREY Kong [facility]    Marcellus Soto is a 61 year old, presenting for the following health issues:  Depression and Anxiety    History of Present Illness       Mental Health Follow-up:  Patient presents to follow-up on Depression &  Anxiety.Patient's depression since last visit has been:  Better  The patient is not having other symptoms associated with depression.  Patient's anxiety since last visit has been:  Better  The patient is having other symptoms associated with anxiety.  Any significant life events: grief or loss  Patient is feeling anxious or having panic attacks.  Patient has no concerns about alcohol or drug use.    She eats 0-1 servings of fruits and vegetables daily.She consumes 0 sweetened beverage(s) daily.She exercises with enough effort to increase her heart rate 20 to 29 minutes per day.  She exercises with enough effort to increase her heart rate 7 days per week.   She is taking medications regularly.    Today's PHQ-9         PHQ-9 Total Score: 9    PHQ-9 Q9 Thoughts of better off dead/self-harm past 2 weeks :   Not at all    How difficult have these problems made it for you to do your work, take care of things at home, or get along with other people: Very difficult  Today's PORTER-7 Score: 12     PHQ 2/17/2022 3/16/2022 6/27/2022   PHQ-9 Total Score 13 17 9   Q9: Thoughts of better off dead/self-harm past 2 weeks Not at all Not at all Not at all     PORTER-7 SCORE 2/3/2022 3/16/2022 6/27/2022   Total Score - 16 (severe anxiety) 12 (moderate anxiety)   Total Score 14 16 12     Review of Systems   See HPI         Objective    /78 (BP Location: Right arm, Patient Position: Sitting, Cuff Size: Adult Regular)   Pulse 66   Temp 98.3  F (36.8  C) (Tympanic)   Resp 18   Wt 71.3 kg (157 lb 3.2 oz)   LMP 05/04/2002   BMI 22.24 kg/m    Body mass index is 22.24 kg/m .  Physical Exam   Constitutional: healthy, alert, and no distress  Head: Normocephalic. Atraumatic  Eyes: No conjunctival injection, sclera anicteric  Respiratory: No resp distress.  Musculoskeletal: extremities normal- no gross deformities noted, and normal muscle tone  Neurologic: Gait normal. CN 2-12 grossly intact  Psychiatric: mentation appears normal and affect  normal/bright       .  ..

## 2022-06-27 NOTE — PATIENT INSTRUCTIONS
Stop Celexa and increase dose of Prozac. You should only be on 1 selective serotonin reuptake inhibitor at the same time.     Continue Wellbutrin. Use Xanax sparingly.     Follow-up with me in 3-6 months if stable, sooner if symptoms not well controlled.

## 2022-10-16 ENCOUNTER — HEALTH MAINTENANCE LETTER (OUTPATIENT)
Age: 62
End: 2022-10-16

## 2022-12-03 ENCOUNTER — HEALTH MAINTENANCE LETTER (OUTPATIENT)
Age: 62
End: 2022-12-03

## 2023-02-13 DIAGNOSIS — F41.9 ANXIETY: ICD-10-CM

## 2023-02-13 DIAGNOSIS — F32.A DEPRESSION, UNSPECIFIED DEPRESSION TYPE: ICD-10-CM

## 2023-02-13 DIAGNOSIS — F33.1 MODERATE EPISODE OF RECURRENT MAJOR DEPRESSIVE DISORDER (H): ICD-10-CM

## 2023-02-15 RX ORDER — HYDROXYZINE PAMOATE 25 MG/1
CAPSULE ORAL
Qty: 270 CAPSULE | Refills: 2 | Status: SHIPPED | OUTPATIENT
Start: 2023-02-15 | End: 2024-06-07

## 2023-02-15 RX ORDER — BUPROPION HYDROCHLORIDE 150 MG/1
TABLET ORAL
Qty: 90 TABLET | Refills: 0 | Status: SHIPPED | OUTPATIENT
Start: 2023-02-15 | End: 2023-06-26

## 2023-06-26 ENCOUNTER — ANCILLARY PROCEDURE (OUTPATIENT)
Dept: MAMMOGRAPHY | Facility: CLINIC | Age: 63
End: 2023-06-26
Payer: COMMERCIAL

## 2023-06-26 ENCOUNTER — OFFICE VISIT (OUTPATIENT)
Dept: FAMILY MEDICINE | Facility: CLINIC | Age: 63
End: 2023-06-26
Payer: COMMERCIAL

## 2023-06-26 VITALS
HEIGHT: 71 IN | WEIGHT: 163.6 LBS | RESPIRATION RATE: 16 BRPM | SYSTOLIC BLOOD PRESSURE: 134 MMHG | BODY MASS INDEX: 22.9 KG/M2 | DIASTOLIC BLOOD PRESSURE: 84 MMHG | OXYGEN SATURATION: 97 % | TEMPERATURE: 98.1 F | HEART RATE: 59 BPM

## 2023-06-26 DIAGNOSIS — F43.21 GRIEF REACTION: ICD-10-CM

## 2023-06-26 DIAGNOSIS — F10.21 ALCOHOLISM IN REMISSION (H): ICD-10-CM

## 2023-06-26 DIAGNOSIS — Z12.31 VISIT FOR SCREENING MAMMOGRAM: ICD-10-CM

## 2023-06-26 DIAGNOSIS — R09.A2 GLOBUS SENSATION: ICD-10-CM

## 2023-06-26 DIAGNOSIS — F33.1 MODERATE EPISODE OF RECURRENT MAJOR DEPRESSIVE DISORDER (H): ICD-10-CM

## 2023-06-26 DIAGNOSIS — F32.A DEPRESSION, UNSPECIFIED DEPRESSION TYPE: Primary | ICD-10-CM

## 2023-06-26 PROCEDURE — 99214 OFFICE O/P EST MOD 30 MIN: CPT | Performed by: PHYSICIAN ASSISTANT

## 2023-06-26 PROCEDURE — 77063 BREAST TOMOSYNTHESIS BI: CPT | Mod: TC | Performed by: RADIOLOGY

## 2023-06-26 PROCEDURE — 77067 SCR MAMMO BI INCL CAD: CPT | Mod: TC | Performed by: RADIOLOGY

## 2023-06-26 RX ORDER — LORAZEPAM 1 MG/1
1 TABLET ORAL EVERY 6 HOURS PRN
Qty: 20 TABLET | Refills: 0 | Status: SHIPPED | OUTPATIENT
Start: 2023-06-26 | End: 2023-11-20

## 2023-06-26 RX ORDER — BUPROPION HYDROCHLORIDE 300 MG/1
300 TABLET ORAL EVERY MORNING
Qty: 90 TABLET | Refills: 3 | Status: SHIPPED | OUTPATIENT
Start: 2023-06-26 | End: 2024-09-10

## 2023-06-26 ASSESSMENT — PATIENT HEALTH QUESTIONNAIRE - PHQ9
SUM OF ALL RESPONSES TO PHQ QUESTIONS 1-9: 9
SUM OF ALL RESPONSES TO PHQ QUESTIONS 1-9: 9
10. IF YOU CHECKED OFF ANY PROBLEMS, HOW DIFFICULT HAVE THESE PROBLEMS MADE IT FOR YOU TO DO YOUR WORK, TAKE CARE OF THINGS AT HOME, OR GET ALONG WITH OTHER PEOPLE: SOMEWHAT DIFFICULT

## 2023-06-26 ASSESSMENT — PAIN SCALES - GENERAL: PAINLEVEL: NO PAIN (0)

## 2023-06-26 NOTE — PROGRESS NOTES
"  Assessment & Plan   Depression, unspecified depression type  Grief reaction  Patient feels that her current medication regimen (Prozac, Wellbutrin, hydroxyzine) is working well overall for her. Notes that anxiety waxes and wanes given health problems in mulitple family members (son's cancer,  recently diagnosed with cancer and had prostate removed). However, she endorses feeling \"tense in her core\" and finds it hard to relax. Endorses poor appetite but weight has been stable. Will try increasing her dose of Wellbutrin and assess for further symptom improvement. Could also try progressive muscle relaxation techniques to see if they help with her tense feeling. Follow-up in 3 months for recheck.   - LORazepam (ATIVAN) 1 MG tablet; Take 1 tablet (1 mg) by mouth every 6 hours as needed for anxiety  - buPROPion (WELLBUTRIN XL) 300 MG 24 hr tablet; Take 1 tablet (300 mg) by mouth every morning    Globus sensation  Feels like something is present/\"growing\" in posterior right side of throat. Ongoing for past couple of months, along with occasional feeling of choking 1x/week. Also occasionally feels like right side of neck is swollen. No problems with breathing or swallowing. Patient has not noticed a direct correlation between the globus sensation and periods of heightened anxiety, though these are very likely to be related given her lack of other constitutional or GI symptoms and normal physical exam. Will monitor for now and reassess at future visit.    Alcoholism in remission (H)  Stable. No recent use. Encouraged ongoing cessation.     SNIDY Vitale-S2  [facility]    MOON KongC  [facility]    Marcellus Soto is a 62 year old, presenting for the following health issues:  Throat Problem        6/26/2023    11:15 AM   Additional Questions   Roomed by Venecia OBRIEN CMA     History of Present Illness       Mental Health Follow-up:  Patient presents to " "follow-up on Anxiety.    Patient's anxiety since last visit has been:  No change  The patient is having other symptoms associated with anxiety. (Feels she is always tense in the core of her body )  Significant life event:: Cancer diagnosis in the family   Patient is feeling anxious or having panic attacks.  Patient has no concerns about alcohol or drug use.    Reason for visit:  Throat issue (Says that multiple times she had been trying to swallow and then chokes. Concerned there might be somthing growing in her throat )  Symptom onset:  More than a month  Symptoms include:  It feels like there is something in the back f my throat, like my esophogus is constricted or partially blocked. I have been \"choking\"  Symptom intensity:  Mild  Symptom progression:  Staying the same  Had these symptoms before:  No  What makes it worse:  No  What makes it better:  No    She eats 0-1 servings of fruits and vegetables daily.She consumes 1 sweetened beverage(s) daily.She exercises with enough effort to increase her heart rate 10 to 19 minutes per day.  She exercises with enough effort to increase her heart rate 3 or less days per week. She is missing 1 dose(s) of medications per week.  She is not taking prescribed medications regularly due to remembering to take.    Today's PHQ-9         PHQ-9 Total Score: 9    PHQ-9 Q9 Thoughts of better off dead/self-harm past 2 weeks :   Not at all    How difficult have these problems made it for you to do your work, take care of things at home, or get along with other people: Somewhat difficult     Review of Systems   See HPI       Objective    /84 (BP Location: Right arm, Patient Position: Sitting, Cuff Size: Adult Regular)   Pulse 59   Temp 98.1  F (36.7  C) (Tympanic)   Resp 16   Ht 1.803 m (5' 11\")   Wt 74.2 kg (163 lb 9.6 oz)   LMP 05/08/2000   SpO2 97%   BMI 22.82 kg/m    Body mass index is 22.82 kg/m .  Physical Exam   GENERAL: healthy, alert, anxious with hands shaking, " in no acute distress  NECK: no adenopathy, no asymmetry, masses, or scars and thyroid normal to palpation  THROAT: normal oropharynx, no erythema or exudates, tonsils 1+ bilaterally  RESP: lungs clear to auscultation - no rales, rhonchi or wheezes  CV: regular rate and rhythm, normal S1 S2, no S3 or S4, no murmur, click or rub, no peripheral edema and peripheral pulses strong  ABDOMEN: soft, nontender, no hepatosplenomegaly, no masses and bowel sounds normal  MS: no gross musculoskeletal defects noted, no edema  PSYCH: mentation appears normal, affect is anxious    Physician Attestation   I, Juvenal Rascon PA-C, was present with the medical/ADRIANE student who participated in the service and in the documentation of the note.  I have verified the history and personally performed the physical exam and medical decision making.  I agree with the assessment and plan of care as documented in the note.      Juvenal Rascon PA-C

## 2023-06-30 ENCOUNTER — ANCILLARY PROCEDURE (OUTPATIENT)
Dept: MAMMOGRAPHY | Facility: CLINIC | Age: 63
End: 2023-06-30
Attending: FAMILY MEDICINE
Payer: COMMERCIAL

## 2023-06-30 DIAGNOSIS — R92.8 ABNORMAL MAMMOGRAM: ICD-10-CM

## 2023-06-30 PROCEDURE — 77065 DX MAMMO INCL CAD UNI: CPT | Mod: LT

## 2023-06-30 NOTE — PROGRESS NOTES
Per [facility] Radiologist,  Dr. Altamirano, I have assisted patient with scheduling the following:       LEFT Breast Stereotactic Guided Core Needle Biopsy   7/7/23 @ 1:00   [address], [address]   [place] [address]  [phone]    Reviewed pre and post biopsy instructions with patient and sent copies home with her. She has my direct number if she has further questions.  Patient denies further questions, verbalizes understanding and agrees with this plan.        Genesis Carrillo, RN, BSN, PHN, CBCN  Breast Center Imaging Nurse Coordinator   [address]  [place] [address]  [phone]

## 2023-07-07 ENCOUNTER — ANCILLARY PROCEDURE (OUTPATIENT)
Dept: MAMMOGRAPHY | Facility: CLINIC | Age: 63
End: 2023-07-07
Attending: FAMILY MEDICINE
Payer: COMMERCIAL

## 2023-07-07 DIAGNOSIS — R92.8 ABNORMAL MAMMOGRAM: ICD-10-CM

## 2023-07-07 PROCEDURE — 88342 IMHCHEM/IMCYTCHM 1ST ANTB: CPT | Mod: 26 | Performed by: PATHOLOGY

## 2023-07-07 PROCEDURE — 88305 TISSUE EXAM BY PATHOLOGIST: CPT | Mod: 26 | Performed by: PATHOLOGY

## 2023-07-07 PROCEDURE — 88341 IMHCHEM/IMCYTCHM EA ADD ANTB: CPT | Mod: 26 | Performed by: PATHOLOGY

## 2023-07-07 PROCEDURE — 250N000009 HC RX 250: Performed by: FAMILY MEDICINE

## 2023-07-07 PROCEDURE — 272N000715 MA STEREOTACTIC BREAST BIOPSY VACUUM LT

## 2023-07-07 PROCEDURE — 88305 TISSUE EXAM BY PATHOLOGIST: CPT | Mod: TC | Performed by: FAMILY MEDICINE

## 2023-07-07 RX ORDER — LIDOCAINE HYDROCHLORIDE AND EPINEPHRINE 10; 10 MG/ML; UG/ML
10 INJECTION, SOLUTION INFILTRATION; PERINEURAL ONCE
Status: COMPLETED | OUTPATIENT
Start: 2023-07-07 | End: 2023-07-07

## 2023-07-07 RX ADMIN — LIDOCAINE HYDROCHLORIDE,EPINEPHRINE BITARTRATE 10 ML: 10; .01 INJECTION, SOLUTION INFILTRATION; PERINEURAL at 13:39

## 2023-07-07 RX ADMIN — LIDOCAINE HYDROCHLORIDE 10 ML: 10 INJECTION, SOLUTION INFILTRATION; PERINEURAL at 13:39

## 2023-07-07 NOTE — PROGRESS NOTES
Reviewed stereotactic core needle biopsy procedure and after care instructions with patient.  Patient verbalizes understanding and signed consent form.  Patient then took her prescribed anxiety medication, 1 mg of Ativan by mouth with water.    Genesis Carrillo RN, BSN, PHN, [facility]  Breast Center Imaging Nurse Coordinator   [address]  [place] [address]  [phone]

## 2023-07-11 ENCOUNTER — TELEPHONE (OUTPATIENT)
Dept: MAMMOGRAPHY | Facility: CLINIC | Age: 63
End: 2023-07-11
Payer: COMMERCIAL

## 2023-07-11 NOTE — TELEPHONE ENCOUNTER
At the request of the Breast Center Radiologist, Dr. Altamirano,  I phoned patient and discussed the benign breast biopsy results.      Per Dr. Altamirano's recommendation, patient was advised that she may return to her routine breast screening schedule.    Patient denies any concerns at her biopsy site. Questions were answered and my phone number given if she has further questions or concerns.  Ordering provider was informed of these results.  Patient verbalized understanding and agrees with the plan of care.       Genesis Carrillo RN, BSN, PHN, CBCN  Breast Center Imaging Nurse Coordinator   [facility]  [address]  [place] [address]  [phone]

## 2023-08-02 LAB
PATH REPORT.ADDENDUM SPEC: NORMAL
PATH REPORT.COMMENTS IMP SPEC: NORMAL
PATH REPORT.FINAL DX SPEC: NORMAL
PATH REPORT.GROSS SPEC: NORMAL
PATH REPORT.MICROSCOPIC SPEC OTHER STN: NORMAL
PATH REPORT.RELEVANT HX SPEC: NORMAL
PHOTO IMAGE: NORMAL

## 2023-08-28 ENCOUNTER — VIRTUAL VISIT (OUTPATIENT)
Dept: FAMILY MEDICINE | Facility: CLINIC | Age: 63
End: 2023-08-28
Attending: PHYSICIAN ASSISTANT
Payer: COMMERCIAL

## 2023-08-28 DIAGNOSIS — F33.1 MODERATE EPISODE OF RECURRENT MAJOR DEPRESSIVE DISORDER (H): Primary | ICD-10-CM

## 2023-08-28 DIAGNOSIS — Z12.11 SCREEN FOR COLON CANCER: ICD-10-CM

## 2023-08-28 DIAGNOSIS — M25.50 POLYARTHRALGIA: ICD-10-CM

## 2023-08-28 PROBLEM — F32.A DEPRESSION, UNSPECIFIED DEPRESSION TYPE: Status: RESOLVED | Noted: 2020-01-23 | Resolved: 2023-08-28

## 2023-08-28 PROCEDURE — 99214 OFFICE O/P EST MOD 30 MIN: CPT | Mod: VID | Performed by: PHYSICIAN ASSISTANT

## 2023-08-28 RX ORDER — CELECOXIB 200 MG/1
200 CAPSULE ORAL 2 TIMES DAILY PRN
Qty: 180 CAPSULE | Refills: 3 | Status: SHIPPED | OUTPATIENT
Start: 2023-08-28 | End: 2024-09-30

## 2023-08-28 ASSESSMENT — ANXIETY QUESTIONNAIRES
GAD7 TOTAL SCORE: 10
3. WORRYING TOO MUCH ABOUT DIFFERENT THINGS: SEVERAL DAYS
1. FEELING NERVOUS, ANXIOUS, OR ON EDGE: NEARLY EVERY DAY
2. NOT BEING ABLE TO STOP OR CONTROL WORRYING: SEVERAL DAYS
7. FEELING AFRAID AS IF SOMETHING AWFUL MIGHT HAPPEN: NOT AT ALL
GAD7 TOTAL SCORE: 10
6. BECOMING EASILY ANNOYED OR IRRITABLE: SEVERAL DAYS
5. BEING SO RESTLESS THAT IT IS HARD TO SIT STILL: SEVERAL DAYS

## 2023-08-28 ASSESSMENT — PATIENT HEALTH QUESTIONNAIRE - PHQ9
5. POOR APPETITE OR OVEREATING: NEARLY EVERY DAY
SUM OF ALL RESPONSES TO PHQ QUESTIONS 1-9: 15

## 2023-08-28 NOTE — PROGRESS NOTES
Brittany is a 63 year old who is being evaluated via a billable video visit.      How would you like to obtain your AVS? MyChart  If the video visit is dropped, the invitation should be resent by: Text to cell phone: [phone]  Will anyone else be joining your video visit? No    Assessment & Plan   Moderate episode of recurrent major depressive disorder (H)  Doing well. Much improved per patient, despite elevated PHQ and PORTER scores. Would like to continue current regimen. Follow-up in 6 months for recheck.   - FLUoxetine (PROZAC) 20 MG capsule; Take 3 capsules (60 mg) by mouth daily    Polyarthralgia  Stable. Refilled Celebrex.   - celecoxib (CELEBREX) 200 MG capsule; Take 1 capsule (200 mg) by mouth 2 times daily as needed for moderate pain    AUDREY Kong [facility]   Brittany is a 63 year old, presenting for the following health issues:  No chief complaint on file.        2023     8:31 AM   Additional Questions   Roomed by Janel ANDREWS       [facility] [place]   Depression and Anxiety Follow-Up  How are you doing with your depression since your last visit? Improved   How are you doing with your anxiety since your last visit?  Improved   Are you having other symptoms that might be associated with depression or anxiety? Yes:  muscle twitching  Have you had a significant life event? No   Do you have any concerns with your use of alcohol or other drugs? No    Social History     Tobacco Use    Smoking status: Former     Packs/day: 1.00     Years: 26.00     Pack years: 26.00     Types: Cigarettes     Quit date: 2002     Years since quittin.3    Smokeless tobacco: Never   Vaping Use    Vaping Use: Never used   Substance Use Topics    Alcohol use: Yes     Comment: beer daily in the evening    Drug use: Yes     Types: Marijuana     Comment: rarely         2022    10:04 AM 2023    11:10 AM 2023     8:33 AM   PHQ   PHQ-9 Total Score 9 9 15   Q9: Thoughts of better  off dead/self-harm past 2 weeks Not at all Not at all Not at all         3/16/2022     7:56 AM 6/27/2022    10:07 AM 8/28/2023     8:33 AM   PORTER-7 SCORE   Total Score 16 (severe anxiety) 12 (moderate anxiety)    Total Score 16 12 10     Review of Systems   See HPI         Objective           Vitals:  No vitals were obtained today due to virtual visit.    Physical Exam   GENERAL: Healthy, alert and no distress  EYES: Eyes grossly normal to inspection.  No discharge or erythema, or obvious scleral/conjunctival abnormalities.  RESP: No audible wheeze, cough, or visible cyanosis.  No visible retractions or increased work of breathing.    SKIN: Visible skin clear. No significant rash, abnormal pigmentation or lesions.  NEURO: Cranial nerves grossly intact.  Mentation and speech appropriate for age.  PSYCH: Mentation appears normal, affect normal/bright, judgement and insight intact, normal speech and appearance well-groomed.      Video-Visit Details    Type of service:  Video Visit     Originating Location (pt. Location): Home    Distant Location (provider location):  On-site  Platform used for Video Visit: Skip

## 2023-12-19 ASSESSMENT — ANXIETY QUESTIONNAIRES
5. BEING SO RESTLESS THAT IT IS HARD TO SIT STILL: NEARLY EVERY DAY
6. BECOMING EASILY ANNOYED OR IRRITABLE: NEARLY EVERY DAY
GAD7 TOTAL SCORE: 21
2. NOT BEING ABLE TO STOP OR CONTROL WORRYING: NEARLY EVERY DAY
GAD7 TOTAL SCORE: 21
7. FEELING AFRAID AS IF SOMETHING AWFUL MIGHT HAPPEN: NEARLY EVERY DAY
1. FEELING NERVOUS, ANXIOUS, OR ON EDGE: NEARLY EVERY DAY
IF YOU CHECKED OFF ANY PROBLEMS ON THIS QUESTIONNAIRE, HOW DIFFICULT HAVE THESE PROBLEMS MADE IT FOR YOU TO DO YOUR WORK, TAKE CARE OF THINGS AT HOME, OR GET ALONG WITH OTHER PEOPLE: EXTREMELY DIFFICULT
4. TROUBLE RELAXING: NEARLY EVERY DAY
3. WORRYING TOO MUCH ABOUT DIFFERENT THINGS: NEARLY EVERY DAY

## 2023-12-19 ASSESSMENT — PATIENT HEALTH QUESTIONNAIRE - PHQ9
SUM OF ALL RESPONSES TO PHQ QUESTIONS 1-9: 21
10. IF YOU CHECKED OFF ANY PROBLEMS, HOW DIFFICULT HAVE THESE PROBLEMS MADE IT FOR YOU TO DO YOUR WORK, TAKE CARE OF THINGS AT HOME, OR GET ALONG WITH OTHER PEOPLE: VERY DIFFICULT
SUM OF ALL RESPONSES TO PHQ QUESTIONS 1-9: 21

## 2023-12-19 NOTE — COMMUNITY RESOURCES LIST (ENGLISH)
12/19/2023   [facility] sambaash  N/A  For questions about this resource list or additional care needs, please contact your primary care clinic or care manager.  Phone: [phone]   Email: N/A   Address: [address] [place] [id number]   Hours: N/A        Housing       Housing search assistance  1  [facility] Distance: 24.8 miles      In-Person   [address] [place] [address]  Language: English  Hours: Mon - Fri 8:00 AM - 4:00 PM  Fees: Free   Phone: (139) 464-4440 Email: Joel@[employer] Website: https://Offees/     Shelter for individuals  2  [facility] Office - Emergency Housing Assistance - Emergency housing Distance: 2.7 miles      In-Person   [address] [place] [address]  Language: English  Hours: Mon - Fri 8:00 AM - 4:30 PM  Fees: Free   Phone: (858) 464-4171 Ext.4 Email: anita@[employer]ITY Website: https://[email or web address] CampusWin[facility] PharmaceuticalsShar[employer].OPPRTUNITY/agency-information     3  [employer] Distance: 20 miles      In-Person   [address] [place] [address]  Language: English  Hours: Mon - Sun Open 24 Hours  Fees: Sliding Fee   Phone: (926) 888-4104 Email: info@[employer] Website: http://www.[employer].org          Important Numbers & Websites       Emergency Services   911  [facility] Services   311  Poison Control   (267) 803-9182  Suicide Prevention Lifeline   (170) 663-1929 (TALK)  Child Abuse Hotline   (243) 813-2139 (4-A-Child)  Sexual Assault Hotline   (288) 585-8939 (HOPE)  National Runaway Safeline   (887) 834-1695 (RUNAWAY)  All-Options Talkline   (669) 803-4799  Substance Abuse Referral   (246) 163-8908 (HELP)

## 2023-12-20 ENCOUNTER — VIRTUAL VISIT (OUTPATIENT)
Dept: FAMILY MEDICINE | Facility: CLINIC | Age: 63
End: 2023-12-20
Payer: COMMERCIAL

## 2023-12-20 DIAGNOSIS — F43.21 GRIEF REACTION: Primary | ICD-10-CM

## 2023-12-20 DIAGNOSIS — F33.1 MODERATE EPISODE OF RECURRENT MAJOR DEPRESSIVE DISORDER (H): ICD-10-CM

## 2023-12-20 PROCEDURE — 99214 OFFICE O/P EST MOD 30 MIN: CPT | Mod: VID | Performed by: PHYSICIAN ASSISTANT

## 2023-12-20 RX ORDER — LORAZEPAM 1 MG/1
.5-1 TABLET ORAL EVERY 6 HOURS PRN
Qty: 30 TABLET | Refills: 1 | Status: SHIPPED | OUTPATIENT
Start: 2023-12-20 | End: 2024-02-21

## 2023-12-20 ASSESSMENT — ENCOUNTER SYMPTOMS: NERVOUS/ANXIOUS: 1

## 2023-12-20 NOTE — PROGRESS NOTES
Brittany is a 63 year old who is being evaluated via a billable video visit.      How would you like to obtain your AVS? Bk  If the video visit is dropped, the invitation should be resent by: Bk  Will anyone else be joining your video visit? No      Assessment & Plan   Grief reaction  Moderate episode of recurrent major depressive disorder (H)  Worsening due to the condition of her son worsening unfortunately. She has a lot of support at home. She is optimizing her chronic anxiety/depression medicine and will continue these. Refilled Ativan. If prescription does not last > 1 month, we will then discuss adjustments in her medicine. Pt in agreement with plan.  referral placed to help find a counselor who specializes in grief. She will also work with her son's palliative care team on this as well. Follow-up as needed.   - LORazepam (ATIVAN) 1 MG tablet; Take 0.5-1 tablets (0.5-1 mg) by mouth every 6 hours as needed for anxiety  - Adult Mental Health  Referral; Future    Juvenal Rascon PA-C  [facility]   Brittany is a 63 year old, presenting for the following health issues:  Depression and Anxiety        12/20/2023     6:48 AM   Additional Questions   Roomed by Adamaris DIOR   Accompanied by Self         12/20/2023     6:48 AM   Patient Reported Additional Medications   Patient reports taking the following new medications .       History of Present Illness       Mental Health Follow-up:  Patient presents to follow-up on Depression & Anxiety.Patient's depression since last visit has been:  Worse  The patient is having other symptoms associated with depression.  Patient's anxiety since last visit has been:  Worse  The patient is having other symptoms associated with anxiety.  Any significant life events: grief or loss  Patient is feeling anxious or having panic attacks.  Patient has no concerns about alcohol or drug use.    She eats 0-1 servings of fruits and vegetables  daily.She consumes 0 sweetened beverage(s) daily.She exercises with enough effort to increase her heart rate 9 or less minutes per day.  She exercises with enough effort to increase her heart rate 3 or less days per week.   She is taking medications regularly.     Hydroxyzine is being spread out through out the day 2 tablets at a time is too much  Pt's son is at end of life  Struggling right now    Review of Systems   Psychiatric/Behavioral:  The patient is nervous/anxious.           Objective         Vitals:  No vitals were obtained today due to virtual visit.    Physical Exam   GENERAL: Healthy, alert and no distress  EYES: Eyes grossly normal to inspection.  No discharge or erythema, or obvious scleral/conjunctival abnormalities.  RESP: No audible wheeze, cough, or visible cyanosis.  No visible retractions or increased work of breathing.    SKIN: Visible skin clear. No significant rash, abnormal pigmentation or lesions.  NEURO: Cranial nerves grossly intact.  Mentation and speech appropriate for age.  PSYCH: Mentation appears normal, affect normal/bright, judgement and insight intact, normal speech and appearance well-groomed.        Video-Visit Details    Type of service:  Video Visit     Originating Location (pt. Location): Home    Distant Location (provider location):  On-site  Platform used for Video Visit: [email or web address]

## 2023-12-20 NOTE — PATIENT INSTRUCTIONS
Ativan refilled with higher amount. If using more than 1 tablet per day, we should adjust your other daily medicines.     Recommend grief counseling. Referral placed today to help you find this. Grief groups also exist, and I think that will be beneficial.

## 2024-01-13 ENCOUNTER — HEALTH MAINTENANCE LETTER (OUTPATIENT)
Age: 64
End: 2024-01-13

## 2024-02-18 ASSESSMENT — ANXIETY QUESTIONNAIRES
7. FEELING AFRAID AS IF SOMETHING AWFUL MIGHT HAPPEN: NEARLY EVERY DAY
1. FEELING NERVOUS, ANXIOUS, OR ON EDGE: NEARLY EVERY DAY
IF YOU CHECKED OFF ANY PROBLEMS ON THIS QUESTIONNAIRE, HOW DIFFICULT HAVE THESE PROBLEMS MADE IT FOR YOU TO DO YOUR WORK, TAKE CARE OF THINGS AT HOME, OR GET ALONG WITH OTHER PEOPLE: EXTREMELY DIFFICULT
2. NOT BEING ABLE TO STOP OR CONTROL WORRYING: NOT AT ALL
7. FEELING AFRAID AS IF SOMETHING AWFUL MIGHT HAPPEN: NEARLY EVERY DAY
8. IF YOU CHECKED OFF ANY PROBLEMS, HOW DIFFICULT HAVE THESE MADE IT FOR YOU TO DO YOUR WORK, TAKE CARE OF THINGS AT HOME, OR GET ALONG WITH OTHER PEOPLE?: EXTREMELY DIFFICULT
4. TROUBLE RELAXING: NEARLY EVERY DAY
3. WORRYING TOO MUCH ABOUT DIFFERENT THINGS: NOT AT ALL
5. BEING SO RESTLESS THAT IT IS HARD TO SIT STILL: NEARLY EVERY DAY
GAD7 TOTAL SCORE: 15
6. BECOMING EASILY ANNOYED OR IRRITABLE: NEARLY EVERY DAY
GAD7 TOTAL SCORE: 15

## 2024-02-20 ASSESSMENT — PATIENT HEALTH QUESTIONNAIRE - PHQ9
SUM OF ALL RESPONSES TO PHQ QUESTIONS 1-9: 22
10. IF YOU CHECKED OFF ANY PROBLEMS, HOW DIFFICULT HAVE THESE PROBLEMS MADE IT FOR YOU TO DO YOUR WORK, TAKE CARE OF THINGS AT HOME, OR GET ALONG WITH OTHER PEOPLE: EXTREMELY DIFFICULT
SUM OF ALL RESPONSES TO PHQ QUESTIONS 1-9: 22

## 2024-02-21 ENCOUNTER — VIRTUAL VISIT (OUTPATIENT)
Dept: FAMILY MEDICINE | Facility: CLINIC | Age: 64
End: 2024-02-21
Payer: COMMERCIAL

## 2024-02-21 DIAGNOSIS — F10.21 ALCOHOLISM IN REMISSION (H): ICD-10-CM

## 2024-02-21 DIAGNOSIS — F33.1 MODERATE EPISODE OF RECURRENT MAJOR DEPRESSIVE DISORDER (H): Primary | ICD-10-CM

## 2024-02-21 DIAGNOSIS — F43.21 GRIEF REACTION: ICD-10-CM

## 2024-02-21 PROCEDURE — 99214 OFFICE O/P EST MOD 30 MIN: CPT | Mod: 95 | Performed by: PHYSICIAN ASSISTANT

## 2024-02-21 RX ORDER — CLONIDINE HYDROCHLORIDE 0.1 MG/1
0.1 TABLET ORAL 2 TIMES DAILY PRN
Qty: 60 TABLET | Refills: 0 | Status: SHIPPED | OUTPATIENT
Start: 2024-02-21 | End: 2024-03-06

## 2024-02-21 RX ORDER — LORAZEPAM 1 MG/1
.5-1 TABLET ORAL DAILY PRN
Qty: 30 TABLET | Refills: 1 | Status: SHIPPED | OUTPATIENT
Start: 2024-02-21 | End: 2024-05-20

## 2024-02-21 ASSESSMENT — ENCOUNTER SYMPTOMS: NERVOUS/ANXIOUS: 1

## 2024-02-21 NOTE — PROGRESS NOTES
Brittany is a 63 year old who is being evaluated via a billable video visit.      How would you like to obtain your AVS? MyChart  If the video visit is dropped, the invitation should be resent by: Text to cell phone: [phone]  Will anyone else be joining your video visit? No      Assessment & Plan   Moderate episode of recurrent major depressive disorder (H)  Grief reaction  Unfortunately the patient's son passed away from his smith with colon cancer.  Her grief has been very significant.  She has been struggling but does have really good family support.  She did decrease her Prozac dose recently even despite this because of some nausea.  I would like her to continue Prozac at 40 mg and Wellbutrin as prescribed.  We refilled her Ativan and will also start clonidine after a shared discussion about helping her with anxiety and grief and sleep.  I also will reach out to our behavioral health counselor Cathy to reach out with the patient.  Brittany has having a difficult time finding a therapist for all the significant Cathy could be a good support system for her.  - LORazepam (ATIVAN) 1 MG tablet; Take 0.5-1 tablets (0.5-1 mg) by mouth daily as needed for anxiety  - cloNIDine (CATAPRES) 0.1 MG tablet; Take 1 tablet (0.1 mg) by mouth 2 times daily as needed (anxiety)    Alcoholism in remission (H)  Stable in remission.     Subjective   Brittany is a 63 year old, presenting for the following health issues:  Depression and Anxiety      2/21/2024     6:52 AM   Additional Questions   Roomed by [facility]     Anxiety    History of Present Illness       Mental Health Follow-up:  Patient presents to follow-up on Depression & Anxiety.Patient's depression since last visit has been:  Medium  The patient is not having other symptoms associated with depression.  Patient's anxiety since last visit has been:  Worse  The patient is having other symptoms associated with anxiety.  Any significant life events: grief or loss  Patient is feeling  anxious or having panic attacks.  Patient has no concerns about alcohol or drug use.    She eats 0-1 servings of fruits and vegetables daily.She consumes 0 sweetened beverage(s) daily.She exercises with enough effort to increase her heart rate 10 to 19 minutes per day.  She exercises with enough effort to increase her heart rate 7 days per week. She is missing 2 dose(s) of medications per week.  She is not taking prescribed medications regularly due to side effects.         8/28/2023     8:33 AM 12/19/2023     6:39 AM 2/20/2024     6:00 PM   PHQ   PHQ-9 Total Score 15 21 22   Q9: Thoughts of better off dead/self-harm past 2 weeks Not at all Not at all Not at all          8/28/2023     8:33 AM 12/19/2023     6:43 AM 2/18/2024    12:11 PM   PORTER-7 SCORE   Total Score  21 (severe anxiety) 15 (severe anxiety)   Total Score 10 21 15       Review of Systems  See HPI       Objective           Vitals:  No vitals were obtained today due to virtual visit.    Physical Exam   GENERAL: alert and no distress  EYES: Eyes grossly normal to inspection.  No discharge or erythema, or obvious scleral/conjunctival abnormalities.  RESP: No audible wheeze, cough, or visible cyanosis.    SKIN: Visible skin clear. No significant rash, abnormal pigmentation or lesions.  NEURO: Cranial nerves grossly intact.  Mentation and speech appropriate for age.  PSYCH: Depressed mood, very tearful        Video-Visit Details    Type of service:  Video Visit     Originating Location (pt. Location): Home    Distant Location (provider location):  On-site  Platform used for Video Visit: Skip  Signed Electronically by: Juvenal Rascon PA-C

## 2024-02-21 NOTE — PATIENT INSTRUCTIONS
Continue Prozac at 40 mg.  Continue Wellbutrin at 300 mg daily.    Use clonidine and hydroxyzine as needed for anxiety.  If anxiety is not controlled well then use Ativan.  Try to use just half a tablet as minimally as possible.  Follow-up with me in 2 weeks for recheck.     I did reach out to Cathy vo our behavioral health counselor who should be reaching out to you soon.

## 2024-03-06 ENCOUNTER — VIRTUAL VISIT (OUTPATIENT)
Dept: FAMILY MEDICINE | Facility: CLINIC | Age: 64
End: 2024-03-06
Payer: COMMERCIAL

## 2024-03-06 DIAGNOSIS — F33.1 MODERATE EPISODE OF RECURRENT MAJOR DEPRESSIVE DISORDER (H): Primary | ICD-10-CM

## 2024-03-06 DIAGNOSIS — F43.21 GRIEF REACTION: ICD-10-CM

## 2024-03-06 PROCEDURE — 99214 OFFICE O/P EST MOD 30 MIN: CPT | Mod: 95 | Performed by: PHYSICIAN ASSISTANT

## 2024-03-06 RX ORDER — CLONIDINE HYDROCHLORIDE 0.1 MG/1
0.1 TABLET ORAL 2 TIMES DAILY PRN
Qty: 60 TABLET | Refills: 2 | Status: SHIPPED | OUTPATIENT
Start: 2024-03-06 | End: 2024-05-20

## 2024-03-06 ASSESSMENT — ANXIETY QUESTIONNAIRES
1. FEELING NERVOUS, ANXIOUS, OR ON EDGE: SEVERAL DAYS
7. FEELING AFRAID AS IF SOMETHING AWFUL MIGHT HAPPEN: SEVERAL DAYS
IF YOU CHECKED OFF ANY PROBLEMS ON THIS QUESTIONNAIRE, HOW DIFFICULT HAVE THESE PROBLEMS MADE IT FOR YOU TO DO YOUR WORK, TAKE CARE OF THINGS AT HOME, OR GET ALONG WITH OTHER PEOPLE: VERY DIFFICULT
7. FEELING AFRAID AS IF SOMETHING AWFUL MIGHT HAPPEN: SEVERAL DAYS
2. NOT BEING ABLE TO STOP OR CONTROL WORRYING: MORE THAN HALF THE DAYS
3. WORRYING TOO MUCH ABOUT DIFFERENT THINGS: MORE THAN HALF THE DAYS
5. BEING SO RESTLESS THAT IT IS HARD TO SIT STILL: SEVERAL DAYS
6. BECOMING EASILY ANNOYED OR IRRITABLE: NOT AT ALL
GAD7 TOTAL SCORE: 8
4. TROUBLE RELAXING: SEVERAL DAYS
GAD7 TOTAL SCORE: 8
8. IF YOU CHECKED OFF ANY PROBLEMS, HOW DIFFICULT HAVE THESE MADE IT FOR YOU TO DO YOUR WORK, TAKE CARE OF THINGS AT HOME, OR GET ALONG WITH OTHER PEOPLE?: VERY DIFFICULT

## 2024-03-06 ASSESSMENT — ENCOUNTER SYMPTOMS: NERVOUS/ANXIOUS: 1

## 2024-03-06 NOTE — PROGRESS NOTES
Brittany is a 63 year old who is being evaluated via a billable video visit.      How would you like to obtain your AVS? MyChart  If the video visit is dropped, the invitation should be resent by: Send to e-mail at: [email or web address]  Will anyone else be joining your video visit? No      Assessment & Plan   Moderate episode of recurrent major depressive disorder (H)  Grief reaction  Improved moderately over the last few weeks with slight medication changes. She would still like to meet with our [facility] so I will reach out to her. No further changes needed to her medication regimen at this time. Follow-up as needed in the future.   - cloNIDine (CATAPRES) 0.1 MG tablet; Take 1 tablet (0.1 mg) by mouth 2 times daily as needed (anxiety)    Subjective   Brittany is a 63 year old, presenting for the following health issues:  Anxiety        3/6/2024     7:45 AM   Additional Questions   Roomed by bryce de la cruz   Accompanied by self     Anxiety    History of Present Illness       Mental Health Follow-up:  Patient presents to follow-up on Anxiety.    Patient's anxiety since last visit has been:  Better  The patient is having other symptoms associated with anxiety.  Any significant life events: No  Patient is feeling anxious or having panic attacks.  Patient has no concerns about alcohol or drug use.      Feels like she's on survival mode   Trying to take the minimal amount of PRN because of past addiction    Better recognition of anxiety and panic attacks.     Review of Systems  See HPI       Objective           Vitals:  No vitals were obtained today due to virtual visit.    Physical Exam   GENERAL: alert and no distress  EYES: Eyes grossly normal to inspection.  No discharge or erythema, or obvious scleral/conjunctival abnormalities.  RESP: No audible wheeze, cough, or visible cyanosis.    SKIN: Visible skin clear. No significant rash, abnormal pigmentation or lesions.  NEURO: Cranial nerves grossly intact.  Mentation and speech  appropriate for age.  PSYCH: Appropriate affect, tone, and pace of words        Video-Visit Details    Type of service:  Video Visit     Originating Location (pt. Location): Home    Distant Location (provider location):  On-site  Platform used for Video Visit: Skip  Signed Electronically by: Juvenal Rascon PA-C

## 2024-05-20 ENCOUNTER — MYC REFILL (OUTPATIENT)
Dept: FAMILY MEDICINE | Facility: CLINIC | Age: 64
End: 2024-05-20
Payer: COMMERCIAL

## 2024-05-20 DIAGNOSIS — F43.21 GRIEF REACTION: ICD-10-CM

## 2024-05-20 DIAGNOSIS — F33.1 MODERATE EPISODE OF RECURRENT MAJOR DEPRESSIVE DISORDER (H): ICD-10-CM

## 2024-05-20 RX ORDER — LORAZEPAM 1 MG/1
TABLET ORAL
Qty: 10 TABLET | Refills: 0 | Status: SHIPPED | OUTPATIENT
Start: 2024-05-20 | End: 2024-08-22

## 2024-05-22 RX ORDER — LORAZEPAM 1 MG/1
.5-1 TABLET ORAL DAILY PRN
Qty: 30 TABLET | Refills: 1 | OUTPATIENT
Start: 2024-05-22

## 2024-05-22 RX ORDER — CLONIDINE HYDROCHLORIDE 0.1 MG/1
0.1 TABLET ORAL 2 TIMES DAILY PRN
Qty: 60 TABLET | Refills: 2 | Status: SHIPPED | OUTPATIENT
Start: 2024-05-22 | End: 2024-06-12

## 2024-06-07 ENCOUNTER — MYC REFILL (OUTPATIENT)
Dept: FAMILY MEDICINE | Facility: CLINIC | Age: 64
End: 2024-06-07
Payer: COMMERCIAL

## 2024-06-07 DIAGNOSIS — F41.9 ANXIETY: ICD-10-CM

## 2024-06-10 RX ORDER — HYDROXYZINE PAMOATE 25 MG/1
CAPSULE ORAL
Qty: 270 CAPSULE | Refills: 1 | OUTPATIENT
Start: 2024-06-10

## 2024-06-10 RX ORDER — HYDROXYZINE PAMOATE 25 MG/1
CAPSULE ORAL
Qty: 270 CAPSULE | Refills: 2 | Status: SHIPPED | OUTPATIENT
Start: 2024-06-10 | End: 2024-09-30

## 2024-06-12 ENCOUNTER — VIRTUAL VISIT (OUTPATIENT)
Dept: FAMILY MEDICINE | Facility: CLINIC | Age: 64
End: 2024-06-12
Payer: COMMERCIAL

## 2024-06-12 DIAGNOSIS — F43.21 GRIEF REACTION: ICD-10-CM

## 2024-06-12 DIAGNOSIS — F33.1 MODERATE EPISODE OF RECURRENT MAJOR DEPRESSIVE DISORDER (H): Primary | ICD-10-CM

## 2024-06-12 PROCEDURE — 99214 OFFICE O/P EST MOD 30 MIN: CPT | Mod: 95 | Performed by: PHYSICIAN ASSISTANT

## 2024-06-12 PROCEDURE — G2211 COMPLEX E/M VISIT ADD ON: HCPCS | Mod: 95 | Performed by: PHYSICIAN ASSISTANT

## 2024-06-12 PROCEDURE — 96127 BRIEF EMOTIONAL/BEHAV ASSMT: CPT | Mod: 95 | Performed by: PHYSICIAN ASSISTANT

## 2024-06-12 RX ORDER — CLONIDINE HYDROCHLORIDE 0.1 MG/1
.1-.2 TABLET ORAL 2 TIMES DAILY
Qty: 270 TABLET | Refills: 1 | Status: SHIPPED | OUTPATIENT
Start: 2024-06-12 | End: 2024-09-24

## 2024-06-12 ASSESSMENT — ANXIETY QUESTIONNAIRES
5. BEING SO RESTLESS THAT IT IS HARD TO SIT STILL: NEARLY EVERY DAY
7. FEELING AFRAID AS IF SOMETHING AWFUL MIGHT HAPPEN: MORE THAN HALF THE DAYS
2. NOT BEING ABLE TO STOP OR CONTROL WORRYING: NOT AT ALL
GAD7 TOTAL SCORE: 14
GAD7 TOTAL SCORE: 14
1. FEELING NERVOUS, ANXIOUS, OR ON EDGE: NEARLY EVERY DAY
6. BECOMING EASILY ANNOYED OR IRRITABLE: NEARLY EVERY DAY
3. WORRYING TOO MUCH ABOUT DIFFERENT THINGS: NOT AT ALL

## 2024-06-12 ASSESSMENT — PATIENT HEALTH QUESTIONNAIRE - PHQ9
SUM OF ALL RESPONSES TO PHQ QUESTIONS 1-9: 16
5. POOR APPETITE OR OVEREATING: NEARLY EVERY DAY
SUM OF ALL RESPONSES TO PHQ QUESTIONS 1-9: 16

## 2024-06-12 NOTE — PROGRESS NOTES
Brittany is a 63 year old who is being evaluated via a billable video visit.    How would you like to obtain your AVS? MyChart  If the video visit is dropped, the invitation should be resent by: Text to cell phone: [phone]  Will anyone else be joining your video visit? No      Assessment & Plan   Moderate episode of recurrent major depressive disorder (H)  Grief reaction  More symptoms of irritability, anger. PHQ score is actually better. I think this is more grief related and she does have an appointment in 1 month with our [facility]. In the meantime, we will adjust her clonidine dosing to take as scheduled, and to increase the dose slightly if needed. She is also still experiencing nausea with Prozac. Can decrease to 40 mg if she would like. Follow-up with [facility] in 1 month, otherwise follow-up with me as needed.   - cloNIDine (CATAPRES) 0.1 MG tablet; Take 1-2 tablets (0.1-0.2 mg) by mouth 2 times daily    The longitudinal plan of care for the diagnosis(es)/condition(s) as documented were addressed during this visit. Due to the added complexity in care, I will continue to support Brittany in the subsequent management and with ongoing continuity of care.     Subjective   Brittany is a 63 year old, presenting for the following health issues:  Mental Health Problem        6/12/2024     6:54 AM   Additional Questions   Roomed by Laura DIOR   Accompanied by self     Mental Health Problem    History of Present Illness       Reason for visit:  Mental health    She eats 0-1 servings of fruits and vegetables daily.She consumes 0 sweetened beverage(s) daily.She exercises with enough effort to increase her heart rate 9 or less minutes per day.  She exercises with enough effort to increase her heart rate 3 or less days per week. She is missing 3 dose(s) of medications per week.  She is not taking prescribed medications regularly due to remembering to take.     Anxiety   How are you doing with your anxiety since your last visit? Improved    Are you having other symptoms that might be associated with anxiety? Yes:    Vomiting   Have you had a significant life event? OTHER: Passing of her son    Are you feeling depressed? Yes:     Do you have any concerns with your use of alcohol or other drugs? No    Body image issues. Irritable.     Social History     Tobacco Use    Smoking status: Former     Current packs/day: 0.00     Average packs/day: 1 pack/day for 26.0 years (26.0 ttl pk-yrs)     Types: Cigarettes     Start date: 1976     Quit date: 2002     Years since quittin.1    Smokeless tobacco: Never   Vaping Use    Vaping status: Never Used   Substance Use Topics    Alcohol use: Yes     Comment: beer daily in the evening    Drug use: Yes     Types: Marijuana     Comment: rarely         2024    12:11 PM 3/6/2024     7:42 AM 2024     7:04 AM   PORTER-7 SCORE   Total Score 15 (severe anxiety) 8 (mild anxiety)    Total Score 15 8 14         2024     6:00 PM 2024     9:44 AM 2024     6:57 AM   PHQ   PHQ-9 Total Score 22 23 16   Q9: Thoughts of better off dead/self-harm past 2 weeks Not at all Not at all Not at all         2024     6:57 AM   Last PHQ-9   1.  Little interest or pleasure in doing things 3   2.  Feeling down, depressed, or hopeless 2   3.  Trouble falling or staying asleep, or sleeping too much 0   4.  Feeling tired or having little energy 3   5.  Poor appetite or overeating 3   6.  Feeling bad about yourself 1   7.  Trouble concentrating 2   8.  Moving slowly or restless 2   Q9: Thoughts of better off dead/self-harm past 2 weeks 0   PHQ-9 Total Score 16     Review of Systems  See HPI       Objective       Vitals:  No vitals were obtained today due to virtual visit.    Physical Exam   GENERAL: alert and no distress  EYES: Eyes grossly normal to inspection.  No discharge or erythema, or obvious scleral/conjunctival abnormalities.  RESP: No audible wheeze, cough, or visible cyanosis.    SKIN: Visible skin  clear. No significant rash, abnormal pigmentation or lesions.  NEURO: Cranial nerves grossly intact.  Mentation and speech appropriate for age.  PSYCH: Appropriate affect, tone, and pace of words        Video-Visit Details  Type of service:  Video Visit   Originating Location (pt. Location): Home    Distant Location (provider location):  On-site  Platform used for Video Visit: Skip  Signed Electronically by: Juvenal Rascon PA-C

## 2024-06-12 NOTE — PATIENT INSTRUCTIONS
Take Clonidine scheduled twice per day. Can take 2 tablets at a time for better symptoms control.     Can decrease Prozac to 40 mg daily. Do this for a few weeks to 1 month to see if nausea improves. You can do this whenever you want.     Follow-up with Laquita in 1 month. Follow-up with me as needed.

## 2024-06-21 ENCOUNTER — MYC REFILL (OUTPATIENT)
Dept: FAMILY MEDICINE | Facility: CLINIC | Age: 64
End: 2024-06-21
Payer: COMMERCIAL

## 2024-06-21 DIAGNOSIS — F43.21 GRIEF REACTION: ICD-10-CM

## 2024-06-21 DIAGNOSIS — F33.1 MODERATE EPISODE OF RECURRENT MAJOR DEPRESSIVE DISORDER (H): ICD-10-CM

## 2024-06-21 RX ORDER — CLONIDINE HYDROCHLORIDE 0.1 MG/1
.1-.2 TABLET ORAL 2 TIMES DAILY
Qty: 270 TABLET | Refills: 1 | OUTPATIENT
Start: 2024-06-21

## 2024-06-24 ENCOUNTER — VIRTUAL VISIT (OUTPATIENT)
Dept: BEHAVIORAL HEALTH | Facility: CLINIC | Age: 64
End: 2024-06-24
Payer: COMMERCIAL

## 2024-06-24 DIAGNOSIS — F43.21 GRIEF: Primary | ICD-10-CM

## 2024-06-24 PROCEDURE — 90834 PSYTX W PT 45 MINUTES: CPT | Mod: 95

## 2024-06-24 ASSESSMENT — ANXIETY QUESTIONNAIRES
3. WORRYING TOO MUCH ABOUT DIFFERENT THINGS: NEARLY EVERY DAY
2. NOT BEING ABLE TO STOP OR CONTROL WORRYING: NEARLY EVERY DAY
1. FEELING NERVOUS, ANXIOUS, OR ON EDGE: NEARLY EVERY DAY
4. TROUBLE RELAXING: NEARLY EVERY DAY
5. BEING SO RESTLESS THAT IT IS HARD TO SIT STILL: NEARLY EVERY DAY
8. IF YOU CHECKED OFF ANY PROBLEMS, HOW DIFFICULT HAVE THESE MADE IT FOR YOU TO DO YOUR WORK, TAKE CARE OF THINGS AT HOME, OR GET ALONG WITH OTHER PEOPLE?: EXTREMELY DIFFICULT
6. BECOMING EASILY ANNOYED OR IRRITABLE: NEARLY EVERY DAY
GAD7 TOTAL SCORE: 20
GAD7 TOTAL SCORE: 20
IF YOU CHECKED OFF ANY PROBLEMS ON THIS QUESTIONNAIRE, HOW DIFFICULT HAVE THESE PROBLEMS MADE IT FOR YOU TO DO YOUR WORK, TAKE CARE OF THINGS AT HOME, OR GET ALONG WITH OTHER PEOPLE: EXTREMELY DIFFICULT
GAD7 TOTAL SCORE: 20
7. FEELING AFRAID AS IF SOMETHING AWFUL MIGHT HAPPEN: MORE THAN HALF THE DAYS
7. FEELING AFRAID AS IF SOMETHING AWFUL MIGHT HAPPEN: MORE THAN HALF THE DAYS

## 2024-06-24 ASSESSMENT — COLUMBIA-SUICIDE SEVERITY RATING SCALE - C-SSRS
1. HAVE YOU WISHED YOU WERE DEAD OR WISHED YOU COULD GO TO SLEEP AND NOT WAKE UP?: NO
2. HAVE YOU ACTUALLY HAD ANY THOUGHTS OF KILLING YOURSELF?: NO

## 2024-06-27 NOTE — PROGRESS NOTES
"[facility]: Integrated Behavioral Health  2024    Behavioral Health Clinician Progress Note    Patient Name: Brittany Michael       Service Type:  Individual      Service Location:   Phone call (patient / identified key support person reached)     Session Start Time: 11:05am  Session End Time: 11:40am      Session Length: 38 - 52      Attendees: Patient     Service Modality:  Phone Visit:      Provider verified identity through the following two step process.  Patient provided:  Patient  and Patient address    Telephone Visit: The patient's condition can be safely assessed and treated via synchronous audio telemedicine encounter.      Reason for Audio Telemedicine Visit: Patient has requested telehealth visit    Originating Site (Patient Location): Patient's home    Distant Site (Provider Location): [facility] MENTAL [facility] & ADDICTION [facility]    Consent:  The patient/guardian has verbally consented to:     1. The potential risks and benefits of telemedicine (telephone visit) versus in person care;    The patient has been notified of the following:      \"We have found that certain health care needs can be provided without the need for a face to face visit.  This service lets us provide the care you need with a phone conversation.       I will have full access to your [facility] medical record during this entire phone call.   I will be taking notes for your medical record.      Since this is like an office visit, we will bill your insurance company for this service.       There are potential benefits and risks of telephone visits (e.g. limits to patient confidentiality) that differ from in-person visits.?Confidentiality still applies for telephone services, and nobody will record the visit.  It is important to be in a quiet, private space that is free of distractions (including cell phone or other devices) during the visit.??      If during the " "course of the call I believe a telephone visit is not appropriate, you will not be charged for this service\"     Consent has been obtained for this service by care team member: Yes     Visit Activities (Refresh list every visit): [facility] and [facility] Only    Diagnostic Assessment Date: Will complete in the next few sessions, not completed due to time constraints.    Treatment Plan Review Date: Will complete in the next few sessions, not completed due to time constraints.    See Flowsheets for today's PHQ-9 and PORTER-7 results  Previous PHQ-9:       2/26/2024     9:44 AM 6/12/2024     6:57 AM 6/24/2024    10:08 AM   PHQ-9 SCORE   PHQ-9 Total Score MyChart 23 (Severe depression) 16 (Moderately severe depression) 13 (Moderate depression)   PHQ-9 Total Score 23 16 13     Previous PORTER-7:       3/6/2024     7:42 AM 6/12/2024     7:04 AM 6/24/2024    10:22 AM   PORTER-7 SCORE   Total Score 8 (mild anxiety)  20 (severe anxiety)   Total Score 8 14 20     DATA  Extended Session (60+ minutes): No  Interactive Complexity: No  Crisis: No  [facility] Patient: No    Treatment Objective(s) Addressed in This Session:  Target Behavior(s): disease management/lifestyle changes grief    Grief / Loss: will increase understanding of normal grieving process, will engage in effective approach to address and resolve grief/loss issues, and will process grief/loss issues in an adaptive manner    Current Stressors / Issues:  Pt stated that she lost her son in the last year. Provided education on grief and the process. Discussed her coping skills and provided education on what else she could try.  Also discussed grief share groups and prompt follow through with exploration.      Progress on Treatment Objective(s) / Homework:  New Objective established this session - PREPARATION (Decided to change - considering how); Intervened by negotiating a change plan and determining options / strategies for behavior change, identifying triggers, exploring social supports, and " working towards setting a date to begin behavior change    Motivational Interviewing    MI Intervention: Expressed Empathy/Understanding, Supported Autonomy, Collaboration, Evocation, Open-ended questions, and Reflections: simple and complex     Change Talk Expressed by the Patient: Desire to change Ability to change    Provider Response to Change Talk: A - Affirmed patient's thoughts, decisions, or attempts at behavior change and R - Reflected patient's change talk    Also provided psychoeducation about behavioral health condition, symptoms, and treatment options    Assessments completed prior to visit:  The following assessments were completed by patient for this visit:  PHQ9:       6/26/2023    11:10 AM 8/28/2023     8:33 AM 12/19/2023     6:39 AM 2/20/2024     6:00 PM 2/26/2024     9:44 AM 6/12/2024     6:57 AM 6/24/2024    10:08 AM   PHQ-9 SCORE   PHQ-9 Total Score MyChart 9 (Mild depression)  21 (Severe depression) 22 (Severe depression) 23 (Severe depression) 16 (Moderately severe depression) 13 (Moderate depression)   PHQ-9 Total Score 9 15 21 22 23 16 13     GAD7:       6/27/2022    10:07 AM 8/28/2023     8:33 AM 12/19/2023     6:43 AM 2/18/2024    12:11 PM 3/6/2024     7:42 AM 6/12/2024     7:04 AM 6/24/2024    10:22 AM   PORTER-7 SCORE   Total Score 12 (moderate anxiety)  21 (severe anxiety) 15 (severe anxiety) 8 (mild anxiety)  20 (severe anxiety)   Total Score 12 10 21 15 8 14 20     CAGE-AID:       6/24/2024    10:24 AM   CAGE-AID Total Score   Total Score 0   Total Score MyChart 0 (A total score of 2 or greater is considered clinically significant)     PROMIS 10-Global Health (only subscores and total score):       6/24/2024    10:24 AM   PROMIS-10 Scores Only   Global Mental Health Score 12   Global Physical Health Score 17   PROMIS TOTAL - SUBSCORES 29     [place] Suicide Severity Rating Scale (Lifetime/Recent)      6/24/2024    11:41 AM   [place] Suicide Severity Rating (Lifetime/Recent)   Q1 Wish  to be Dead (Lifetime) N   Q2 Non-Specific Active Suicidal Thoughts (Lifetime) N       Care Plan review completed: Yes    Medication Review:  No current psychiatric medications prescribed    Medication Compliance:  NA    Changes in Health Issues:   None reported    Chemical Use Review:   Substance Use: Chemical use reviewed, no active concerns identified      Tobacco Use: No current tobacco use.      Assessment: Current Emotional / Mental Status (status of significant symptoms):  Risk status (Self / Other harm or suicidal ideation)  Patient denies a history of suicidal ideation, suicide attempts, self-injurious behavior, homicidal ideation, homicidal behavior, and and other safety concerns  Patient denies current fears or concerns for personal safety.  Patient denies current or recent suicidal ideation or behaviors.  Patient denies current or recent homicidal ideation or behaviors.  Patient denies current or recent self injurious behavior or ideation.  Patient denies other safety concerns.  A safety and risk management plan has not been developed at this time, however patient was encouraged to call [place] [phone] should there be a change in any of these risk factors.    Appearance:   Appropriate   Eye Contact:   Good   Psychomotor Behavior: Normal   Attitude:   Cooperative   Orientation:   All  Speech   Rate / Production: Normal    Volume:  Normal   Mood:    Normal  Affect:    Appropriate   Thought Content:  Clear   Thought Form:  Coherent  Logical   Insight:    Good     Diagnoses:  1. Grief      Collateral Reports Completed:  Not Applicable    Plan: (Homework, other):  Patient was given information about behavioral services and encouraged to schedule a follow up appointment with the clinic [facility] in 2 weeks.  She was also given information about mental health symptoms and treatment options .  CD Recommendations: No indications of CD issues.     LALYN Nunez  6/24/24      Answers submitted by the  patient for this visit:  Patient Health Questionnaire (Submitted on 6/24/2024)  If you checked off any problems, how difficult have these problems made it for you to do your work, take care of things at home, or get along with other people?: Very difficult  PHQ9 TOTAL SCORE: 13  PORTER-7 (Submitted on 6/24/2024)  PORTER 7 TOTAL SCORE: 20

## 2024-07-01 ENCOUNTER — VIRTUAL VISIT (OUTPATIENT)
Dept: BEHAVIORAL HEALTH | Facility: CLINIC | Age: 64
End: 2024-07-01
Payer: COMMERCIAL

## 2024-07-01 DIAGNOSIS — F43.21 GRIEF: Primary | ICD-10-CM

## 2024-07-01 PROCEDURE — 90834 PSYTX W PT 45 MINUTES: CPT | Mod: 93

## 2024-07-01 NOTE — PROGRESS NOTES
"[facility] Primary Care: Integrated Behavioral Health  2024    Behavioral Health Clinician Progress Note    Patient Name: Brittany Michael       Service Type:  Individual      Service Location:   Phone call (patient / identified key support person reached)     Session Start Time: 10:00am  Session End Time: 10:47am      Session Length: 38 - 52      Attendees: Patient     Service Modality:  Phone Visit:      Provider verified identity through the following two step process.  Patient provided:  Patient  and Patient address    Telephone Visit: The patient's condition can be safely assessed and treated via synchronous audio telemedicine encounter.      Reason for Audio Telemedicine Visit: Patient has requested telehealth visit    Originating Site (Patient Location): Patient's home    Distant Site (Provider Location): [facility] MENTAL [facility] & ADDICTION [facility]    Consent:  The patient/guardian has verbally consented to:     1. The potential risks and benefits of telemedicine (telephone visit) versus in person care;    The patient has been notified of the following:      \"We have found that certain health care needs can be provided without the need for a face to face visit.  This service lets us provide the care you need with a phone conversation.       I will have full access to your [facility] medical record during this entire phone call.   I will be taking notes for your medical record.      Since this is like an office visit, we will bill your insurance company for this service.       There are potential benefits and risks of telephone visits (e.g. limits to patient confidentiality) that differ from in-person visits.?Confidentiality still applies for telephone services, and nobody will record the visit.  It is important to be in a quiet, private space that is free of distractions (including cell phone or other devices) during the visit.??      If during the " "course of the call I believe a telephone visit is not appropriate, you will not be charged for this service\"     Consent has been obtained for this service by care team member: Yes     Visit Activities (Refresh list every visit): [facility] and [facility] [place] Only    Diagnostic Assessment Date: Will complete in the next few sessions, not completed due to time constraints.    Treatment Plan Review Date: Will complete in the next few sessions, not completed due to time constraints.    See Flowsheets for today's PHQ-9 and PORTER-7 results  Previous PHQ-9:       2/26/2024     9:44 AM 6/12/2024     6:57 AM 6/24/2024    10:08 AM   PHQ-9 SCORE   PHQ-9 Total Score MyChart 23 (Severe depression) 16 (Moderately severe depression) 13 (Moderate depression)   PHQ-9 Total Score 23 16 13     Previous PORTER-7:       3/6/2024     7:42 AM 6/12/2024     7:04 AM 6/24/2024    10:22 AM   PORTER-7 SCORE   Total Score 8 (mild anxiety)  20 (severe anxiety)   Total Score 8 14 20     DATA  Extended Session (60+ minutes): No  Interactive Complexity: No  Crisis: No  [facility] Patient: No    Treatment Objective(s) Addressed in This Session:  Target Behavior(s): disease management/lifestyle changes grief    Grief / Loss: will increase understanding of normal grieving process, will engage in effective approach to address and resolve grief/loss issues, and will process grief/loss issues in an adaptive manner    Current Stressors / Issues:  Pt discussed her recent change in coping skills.  Discussed what worked and didn't.  Review barriers and ways to continue to process her grief.  Discussed her coping skills and provided education on what else she could try.  Also discussed grief share groups and prompt follow through with exploration.      Progress on Treatment Objective(s) / Homework:  Satisfactory progress - ACTION (Actively working towards change); Intervened by reinforcing change plan / affirming steps taken    Motivational Interviewing    MI Intervention: Expressed " Empathy/Understanding, Supported Autonomy, Collaboration, Evocation, Open-ended questions, and Reflections: simple and complex     Change Talk Expressed by the Patient: Desire to change Ability to change    Provider Response to Change Talk: A - Affirmed patient's thoughts, decisions, or attempts at behavior change and R - Reflected patient's change talk    Also provided psychoeducation about behavioral health condition, symptoms, and treatment options    Assessments completed prior to visit:  The following assessments were completed by patient for this visit:  PHQ9:       6/26/2023    11:10 AM 8/28/2023     8:33 AM 12/19/2023     6:39 AM 2/20/2024     6:00 PM 2/26/2024     9:44 AM 6/12/2024     6:57 AM 6/24/2024    10:08 AM   PHQ-9 SCORE   PHQ-9 Total Score MyChart 9 (Mild depression)  21 (Severe depression) 22 (Severe depression) 23 (Severe depression) 16 (Moderately severe depression) 13 (Moderate depression)   PHQ-9 Total Score 9 15 21 22 23 16 13     GAD7:       6/27/2022    10:07 AM 8/28/2023     8:33 AM 12/19/2023     6:43 AM 2/18/2024    12:11 PM 3/6/2024     7:42 AM 6/12/2024     7:04 AM 6/24/2024    10:22 AM   PORTER-7 SCORE   Total Score 12 (moderate anxiety)  21 (severe anxiety) 15 (severe anxiety) 8 (mild anxiety)  20 (severe anxiety)   Total Score 12 10 21 15 8 14 20     CAGE-AID:       6/24/2024    10:24 AM   CAGE-AID Total Score   Total Score 0   Total Score MyChart 0 (A total score of 2 or greater is considered clinically significant)     PROMIS 10-Global Health (only subscores and total score):       6/24/2024    10:24 AM   PROMIS-10 Scores Only   Global Mental Health Score 12   Global Physical Health Score 17   PROMIS TOTAL - SUBSCORES 29     [place] Suicide Severity Rating Scale (Lifetime/Recent)      6/24/2024    11:41 AM   [place] Suicide Severity Rating (Lifetime/Recent)   Q1 Wish to be Dead (Lifetime) N   Q2 Non-Specific Active Suicidal Thoughts (Lifetime) N     Care Plan review completed:  Yes    Medication Review:  No current psychiatric medications prescribed    Medication Compliance:  NA    Changes in Health Issues:   None reported    Chemical Use Review:   Substance Use: Chemical use reviewed, no active concerns identified      Tobacco Use: No current tobacco use.      Assessment: Current Emotional / Mental Status (status of significant symptoms):  Risk status (Self / Other harm or suicidal ideation)  Patient denies a history of suicidal ideation, suicide attempts, self-injurious behavior, homicidal ideation, homicidal behavior, and and other safety concerns  Patient denies current fears or concerns for personal safety.  Patient denies current or recent suicidal ideation or behaviors.  Patient denies current or recent homicidal ideation or behaviors.  Patient denies current or recent self injurious behavior or ideation.  Patient denies other safety concerns.  A safety and risk management plan has not been developed at this time, however patient was encouraged to call [place] [phone] should there be a change in any of these risk factors.    Appearance:   Appropriate   Eye Contact:   Good   Psychomotor Behavior: Normal   Attitude:   Cooperative   Orientation:   All  Speech   Rate / Production: Normal    Volume:  Normal   Mood:    Normal  Affect:    Appropriate   Thought Content:  Clear   Thought Form:  Coherent  Logical   Insight:    Good     Diagnoses:  1. Grief      Collateral Reports Completed:  Not Applicable    Plan: (Homework, other):  Patient was given information about behavioral services and encouraged to schedule a follow up appointment with the clinic [facility] in 2 weeks.  She was also given information about mental health symptoms and treatment options .  CD Recommendations: No indications of CD issues.     ALLYN Nunez  7/1/24      Answers submitted by the patient for this visit:  Patient Health Questionnaire (Submitted on 6/24/2024)  If you checked off any problems, how  difficult have these problems made it for you to do your work, take care of things at home, or get along with other people?: Very difficult  PHQ9 TOTAL SCORE: 13  PORTER-7 (Submitted on 6/24/2024)  PORTER 7 TOTAL SCORE: 20

## 2024-07-24 ENCOUNTER — TELEPHONE (OUTPATIENT)
Dept: FAMILY MEDICINE | Facility: CLINIC | Age: 64
End: 2024-07-24
Payer: COMMERCIAL

## 2024-07-24 NOTE — TELEPHONE ENCOUNTER
Patient Quality Outreach    Patient is due for the following:   Colon Cancer Screening  Breast Cancer Screening - Mammogram    Next Steps:   Determine patients preferred colon cancer screening method     Type of outreach:    Sent [employer] message.    Next Steps:  Reach out within 90 days via Phone.    Max number of attempts reached: No. Will try again in 90 days if patient still on fail list.    Questions for provider review:    None           Venecia Will CMA  Chart routed to Care Team.

## 2024-07-31 ENCOUNTER — MYC REFILL (OUTPATIENT)
Dept: FAMILY MEDICINE | Facility: CLINIC | Age: 64
End: 2024-07-31
Payer: COMMERCIAL

## 2024-07-31 DIAGNOSIS — F33.1 MODERATE EPISODE OF RECURRENT MAJOR DEPRESSIVE DISORDER (H): ICD-10-CM

## 2024-08-02 NOTE — TELEPHONE ENCOUNTER
Pending Prescriptions:                       Disp   Refills    FLUoxetine (PROZAC) 20 MG capsule          270 ca*3        Sig: Take 3 capsules (60 mg) by mouth daily    Routing refill request to provider for review/approval because:  PHQ-9 score not in goal range.        2/26/2024     9:44 AM 6/12/2024     6:57 AM 6/24/2024    10:08 AM   PHQ   PHQ-9 Total Score 23 16 13   Q9: Thoughts of better off dead/self-harm past 2 weeks Not at all Not at all Not at all     Savita Craig RN  [facility]

## 2024-08-20 ASSESSMENT — ANXIETY QUESTIONNAIRES
6. BECOMING EASILY ANNOYED OR IRRITABLE: MORE THAN HALF THE DAYS
4. TROUBLE RELAXING: SEVERAL DAYS
1. FEELING NERVOUS, ANXIOUS, OR ON EDGE: MORE THAN HALF THE DAYS
7. FEELING AFRAID AS IF SOMETHING AWFUL MIGHT HAPPEN: NOT AT ALL
IF YOU CHECKED OFF ANY PROBLEMS ON THIS QUESTIONNAIRE, HOW DIFFICULT HAVE THESE PROBLEMS MADE IT FOR YOU TO DO YOUR WORK, TAKE CARE OF THINGS AT HOME, OR GET ALONG WITH OTHER PEOPLE: SOMEWHAT DIFFICULT
3. WORRYING TOO MUCH ABOUT DIFFERENT THINGS: SEVERAL DAYS
5. BEING SO RESTLESS THAT IT IS HARD TO SIT STILL: SEVERAL DAYS
8. IF YOU CHECKED OFF ANY PROBLEMS, HOW DIFFICULT HAVE THESE MADE IT FOR YOU TO DO YOUR WORK, TAKE CARE OF THINGS AT HOME, OR GET ALONG WITH OTHER PEOPLE?: SOMEWHAT DIFFICULT
GAD7 TOTAL SCORE: 8
7. FEELING AFRAID AS IF SOMETHING AWFUL MIGHT HAPPEN: NOT AT ALL
2. NOT BEING ABLE TO STOP OR CONTROL WORRYING: SEVERAL DAYS
GAD7 TOTAL SCORE: 8

## 2024-08-22 ENCOUNTER — VIRTUAL VISIT (OUTPATIENT)
Dept: FAMILY MEDICINE | Facility: CLINIC | Age: 64
End: 2024-08-22
Payer: COMMERCIAL

## 2024-08-22 DIAGNOSIS — F43.21 GRIEF REACTION: ICD-10-CM

## 2024-08-22 DIAGNOSIS — F33.1 MODERATE EPISODE OF RECURRENT MAJOR DEPRESSIVE DISORDER (H): Primary | ICD-10-CM

## 2024-08-22 PROCEDURE — G2211 COMPLEX E/M VISIT ADD ON: HCPCS | Mod: 95 | Performed by: PHYSICIAN ASSISTANT

## 2024-08-22 PROCEDURE — 99214 OFFICE O/P EST MOD 30 MIN: CPT | Mod: 95 | Performed by: PHYSICIAN ASSISTANT

## 2024-08-22 ASSESSMENT — PATIENT HEALTH QUESTIONNAIRE - PHQ9
10. IF YOU CHECKED OFF ANY PROBLEMS, HOW DIFFICULT HAVE THESE PROBLEMS MADE IT FOR YOU TO DO YOUR WORK, TAKE CARE OF THINGS AT HOME, OR GET ALONG WITH OTHER PEOPLE: SOMEWHAT DIFFICULT
SUM OF ALL RESPONSES TO PHQ QUESTIONS 1-9: 10
SUM OF ALL RESPONSES TO PHQ QUESTIONS 1-9: 10

## 2024-08-22 NOTE — PROGRESS NOTES
Brittany is a 64 year old who is being evaluated via a billable video visit.    How would you like to obtain your AVS? MyChart  If the video visit is dropped, the invitation should be resent by: Text to cell phone: [phone]  Will anyone else be joining your video visit? No      Assessment & Plan   Moderate episode of recurrent major depressive disorder (H)  Grief reaction  Symptoms are much improved with the regimen change we made at her last appointment and with a few counseling sessions from our [facility]. Clonidine has been very helpful and she is only taking 1 tablet of Prozac at this time. No longer having an shaking, nausea/vomiting and anxiety is the best it has been in over a year. She has strong family support at home. We will continue her current regimen and follow-up as needed based on symptoms. I would like to see her for a physical in the next 6 months, and patient is aware to schedule an appointment.   - FLUoxetine (PROZAC) 20 MG capsule; Take 1 capsule (20 mg) by mouth daily.    The longitudinal plan of care for the diagnosis(es)/condition(s) as documented were addressed during this visit. Due to the added complexity in care, I will continue to support Brittany in the subsequent management and with ongoing continuity of care.       Subjective   Brittany is a 64 year old, presenting for the following health issues:  No chief complaint on file.        8/22/2024     8:30 AM   Additional Questions   Roomed by Venecia OBRIEN CMA   Accompanied by Self     History of Present Illness       Mental Health Follow-up:  Patient presents to follow-up on Depression & Anxiety.Patient's depression since last visit has been:  Good  The patient is not having other symptoms associated with depression.  Patient's anxiety since last visit has been:  Good  The patient is not having other symptoms associated with anxiety.  Any significant life events: grief or loss  Patient is feeling anxious or having panic attacks.  Patient has no concerns  about alcohol or drug use.    She eats 2-3 servings of fruits and vegetables daily.She consumes 1 sweetened beverage(s) daily.She exercises with enough effort to increase her heart rate 10 to 19 minutes per day.  She exercises with enough effort to increase her heart rate 7 days per week. She is missing 1 dose(s) of medications per week.  She is not taking prescribed medications regularly due to remembering to take.         6/12/2024     6:57 AM 6/24/2024    10:08 AM 8/22/2024     8:15 AM   PHQ   PHQ-9 Total Score 16 13 10   Q9: Thoughts of better off dead/self-harm past 2 weeks Not at all Not at all Not at all         6/12/2024     7:04 AM 6/24/2024    10:22 AM 8/20/2024     3:12 PM   PORTER-7 SCORE   Total Score  20 (severe anxiety) 8 (mild anxiety)   Total Score 14 20 8     Review of Systems  See HPI       Objective       Vitals:  No vitals were obtained today due to virtual visit.    Physical Exam   GENERAL: alert and no distress  EYES: Eyes grossly normal to inspection.  No discharge or erythema, or obvious scleral/conjunctival abnormalities.  RESP: No audible wheeze, cough, or visible cyanosis.    SKIN: Visible skin clear. No significant rash, abnormal pigmentation or lesions.  NEURO: Cranial nerves grossly intact.  Mentation and speech appropriate for age.  PSYCH: Appropriate affect, tone, and pace of words        Video-Visit Details    Type of service:  Video Visit   Originating Location (pt. Location): Home    Distant Location (provider location):  On-site  Platform used for Video Visit: Skip  Signed Electronically by: Juvenal Rascon PA-C

## 2024-08-22 NOTE — PATIENT INSTRUCTIONS
Great to see that you are doing better Brittany! That makes me really happy!     Continue current regimen for now.     Follow-up with me in 6 months for a physical.

## 2024-09-10 ENCOUNTER — MYC REFILL (OUTPATIENT)
Dept: FAMILY MEDICINE | Facility: CLINIC | Age: 64
End: 2024-09-10
Payer: COMMERCIAL

## 2024-09-10 DIAGNOSIS — F43.21 GRIEF REACTION: ICD-10-CM

## 2024-09-10 DIAGNOSIS — F33.1 MODERATE EPISODE OF RECURRENT MAJOR DEPRESSIVE DISORDER (H): ICD-10-CM

## 2024-09-11 RX ORDER — BUPROPION HYDROCHLORIDE 300 MG/1
300 TABLET ORAL EVERY MORNING
Qty: 90 TABLET | Refills: 3 | Status: SHIPPED | OUTPATIENT
Start: 2024-09-11 | End: 2024-09-30

## 2024-09-11 NOTE — TELEPHONE ENCOUNTER
Requested Prescriptions   Pending Prescriptions Disp Refills    buPROPion (WELLBUTRIN XL) 300 MG 24 hr tablet 90 tablet 3     Sig: Take 1 tablet (300 mg) by mouth every morning.       Rx Protocol Bupropion Failed - 9/10/2024 10:23 AM        Failed - PHQ-9 score of less than 5 in past 6 months     Please review last PHQ-9 score.           Failed - Blood pressure on file in the past 12 months        Passed - The medication is active on the med list        Passed - Recent (12 mo) or future (90 days) visit within the authorizing provider's specialty     The patient must have completed an in-person or virtual visit within the past 12 months or has a future visit scheduled within the next 90 days with the authorizing provider s specialty.  Urgent care and e-visits do not quality as an office visit for this protocol.          Passed - Medication is indicated for associated diagnosis     Medication is associated with one or more of the following diagnoses:  Anxiety  Bipolar Disorder  Depression  Smoking Cessation  Adjustment disorder with mixed anxiety and depressed mood  Adjustment disorder with anxiety  Tobacco use disorder  Tobacco abuse          Passed - Patient is age 18 or older        Passed - No active pregnancy on record        Passed - No positive pregnancy test in past 12 months

## 2024-09-23 DIAGNOSIS — F43.21 GRIEF REACTION: ICD-10-CM

## 2024-09-23 DIAGNOSIS — F33.1 MODERATE EPISODE OF RECURRENT MAJOR DEPRESSIVE DISORDER (H): ICD-10-CM

## 2024-09-24 RX ORDER — CLONIDINE HYDROCHLORIDE 0.1 MG/1
0.1-0.2 TABLET ORAL 2 TIMES DAILY
Qty: 270 TABLET | Refills: 1 | Status: SHIPPED | OUTPATIENT
Start: 2024-09-24 | End: 2024-09-30

## 2024-09-30 ENCOUNTER — OFFICE VISIT (OUTPATIENT)
Dept: FAMILY MEDICINE | Facility: CLINIC | Age: 64
End: 2024-09-30
Attending: PHYSICIAN ASSISTANT
Payer: COMMERCIAL

## 2024-09-30 ENCOUNTER — TELEPHONE (OUTPATIENT)
Dept: FAMILY MEDICINE | Facility: CLINIC | Age: 64
End: 2024-09-30

## 2024-09-30 VITALS
WEIGHT: 166 LBS | DIASTOLIC BLOOD PRESSURE: 78 MMHG | BODY MASS INDEX: 23.24 KG/M2 | HEIGHT: 71 IN | HEART RATE: 54 BPM | SYSTOLIC BLOOD PRESSURE: 118 MMHG | RESPIRATION RATE: 18 BRPM | TEMPERATURE: 97.7 F | OXYGEN SATURATION: 98 %

## 2024-09-30 DIAGNOSIS — Z13.1 SCREENING FOR DIABETES MELLITUS: ICD-10-CM

## 2024-09-30 DIAGNOSIS — Z85.3 HX: BREAST CANCER: ICD-10-CM

## 2024-09-30 DIAGNOSIS — F33.1 MODERATE EPISODE OF RECURRENT MAJOR DEPRESSIVE DISORDER (H): Primary | ICD-10-CM

## 2024-09-30 DIAGNOSIS — R59.0 LYMPHADENOPATHY, AXILLARY: ICD-10-CM

## 2024-09-30 DIAGNOSIS — F43.21 GRIEF REACTION: ICD-10-CM

## 2024-09-30 DIAGNOSIS — Z12.83 SCREENING FOR SKIN CANCER: ICD-10-CM

## 2024-09-30 DIAGNOSIS — Z12.11 SCREEN FOR COLON CANCER: ICD-10-CM

## 2024-09-30 DIAGNOSIS — F41.9 ANXIETY: ICD-10-CM

## 2024-09-30 DIAGNOSIS — Z78.0 POSTMENOPAUSAL STATUS: ICD-10-CM

## 2024-09-30 DIAGNOSIS — R79.89 ELEVATED SERUM CREATININE: ICD-10-CM

## 2024-09-30 DIAGNOSIS — Z13.6 CARDIOVASCULAR SCREENING; LDL GOAL LESS THAN 130: ICD-10-CM

## 2024-09-30 DIAGNOSIS — M25.50 POLYARTHRALGIA: ICD-10-CM

## 2024-09-30 DIAGNOSIS — Z00.00 ROUTINE GENERAL MEDICAL EXAMINATION AT A HEALTH CARE FACILITY: ICD-10-CM

## 2024-09-30 LAB
ANION GAP SERPL CALCULATED.3IONS-SCNC: 10 MMOL/L (ref 7–15)
BUN SERPL-MCNC: 7.8 MG/DL (ref 8–23)
CALCIUM SERPL-MCNC: 8.5 MG/DL (ref 8.8–10.4)
CHLORIDE SERPL-SCNC: 96 MMOL/L (ref 98–107)
CHOLEST SERPL-MCNC: 215 MG/DL
CREAT SERPL-MCNC: 0.99 MG/DL (ref 0.51–0.95)
EGFRCR SERPLBLD CKD-EPI 2021: 63 ML/MIN/1.73M2
EST. AVERAGE GLUCOSE BLD GHB EST-MCNC: 111 MG/DL
FASTING STATUS PATIENT QL REPORTED: YES
FASTING STATUS PATIENT QL REPORTED: YES
GLUCOSE SERPL-MCNC: 91 MG/DL (ref 70–99)
HBA1C MFR BLD: 5.5 % (ref 0–5.6)
HCO3 SERPL-SCNC: 26 MMOL/L (ref 22–29)
HDLC SERPL-MCNC: 39 MG/DL
LDLC SERPL CALC-MCNC: 153 MG/DL
NONHDLC SERPL-MCNC: 176 MG/DL
POTASSIUM SERPL-SCNC: 4.2 MMOL/L (ref 3.4–5.3)
SODIUM SERPL-SCNC: 132 MMOL/L (ref 135–145)
TRIGL SERPL-MCNC: 114 MG/DL

## 2024-09-30 PROCEDURE — 90677 PCV20 VACCINE IM: CPT | Performed by: PHYSICIAN ASSISTANT

## 2024-09-30 PROCEDURE — 90472 IMMUNIZATION ADMIN EACH ADD: CPT | Performed by: PHYSICIAN ASSISTANT

## 2024-09-30 PROCEDURE — 99214 OFFICE O/P EST MOD 30 MIN: CPT | Mod: 25 | Performed by: PHYSICIAN ASSISTANT

## 2024-09-30 PROCEDURE — 90480 ADMN SARSCOV2 VAC 1/ONLY CMP: CPT | Performed by: PHYSICIAN ASSISTANT

## 2024-09-30 PROCEDURE — 90471 IMMUNIZATION ADMIN: CPT | Performed by: PHYSICIAN ASSISTANT

## 2024-09-30 PROCEDURE — 80061 LIPID PANEL: CPT | Performed by: PHYSICIAN ASSISTANT

## 2024-09-30 PROCEDURE — 80048 BASIC METABOLIC PNL TOTAL CA: CPT | Performed by: PHYSICIAN ASSISTANT

## 2024-09-30 PROCEDURE — 90662 IIV NO PRSV INCREASED AG IM: CPT | Performed by: PHYSICIAN ASSISTANT

## 2024-09-30 PROCEDURE — 36415 COLL VENOUS BLD VENIPUNCTURE: CPT | Performed by: PHYSICIAN ASSISTANT

## 2024-09-30 PROCEDURE — 91320 SARSCV2 VAC 30MCG TRS-SUC IM: CPT | Performed by: PHYSICIAN ASSISTANT

## 2024-09-30 PROCEDURE — 99397 PER PM REEVAL EST PAT 65+ YR: CPT | Mod: 25 | Performed by: PHYSICIAN ASSISTANT

## 2024-09-30 PROCEDURE — 83036 HEMOGLOBIN GLYCOSYLATED A1C: CPT | Performed by: PHYSICIAN ASSISTANT

## 2024-09-30 RX ORDER — CELECOXIB 200 MG/1
200 CAPSULE ORAL 2 TIMES DAILY PRN
Qty: 180 CAPSULE | Refills: 3 | Status: SHIPPED | OUTPATIENT
Start: 2024-09-30

## 2024-09-30 RX ORDER — HYDROXYZINE PAMOATE 25 MG/1
CAPSULE ORAL
Qty: 270 CAPSULE | Refills: 3 | Status: SHIPPED | OUTPATIENT
Start: 2024-09-30 | End: 2024-10-01

## 2024-09-30 RX ORDER — CLONIDINE HYDROCHLORIDE 0.1 MG/1
0.1-0.2 TABLET ORAL 2 TIMES DAILY
Qty: 270 TABLET | Refills: 3 | Status: SHIPPED | OUTPATIENT
Start: 2024-09-30

## 2024-09-30 RX ORDER — BUPROPION HYDROCHLORIDE 300 MG/1
300 TABLET ORAL EVERY MORNING
Qty: 90 TABLET | Refills: 3 | Status: SHIPPED | OUTPATIENT
Start: 2024-09-30

## 2024-09-30 RX ORDER — HYDROXYZINE HYDROCHLORIDE 10 MG/1
10 TABLET, FILM COATED ORAL 3 TIMES DAILY PRN
Qty: 30 TABLET | Refills: 3 | Status: SHIPPED | OUTPATIENT
Start: 2024-09-30

## 2024-09-30 SDOH — HEALTH STABILITY: PHYSICAL HEALTH: ON AVERAGE, HOW MANY DAYS PER WEEK DO YOU ENGAGE IN MODERATE TO STRENUOUS EXERCISE (LIKE A BRISK WALK)?: 2 DAYS

## 2024-09-30 ASSESSMENT — SOCIAL DETERMINANTS OF HEALTH (SDOH): HOW OFTEN DO YOU GET TOGETHER WITH FRIENDS OR RELATIVES?: TWICE A WEEK

## 2024-09-30 ASSESSMENT — PAIN SCALES - GENERAL: PAINLEVEL: NO PAIN (0)

## 2024-09-30 NOTE — PROGRESS NOTES
Preventive Care Visit  [facility]  Juvenal Rascon PA-C, Family Medicine  Sep 30, 2024      Assessment & Plan   Moderate episode of recurrent major depressive disorder (H)  Grief reaction  Anxiety  Stable, will continue same regimen. Refilled given.   - Basic metabolic panel  (Ca, Cl, CO2, Creat, Gluc, K, Na, BUN); Future  - cloNIDine (CATAPRES) 0.1 MG tablet; Take 1-2 tablets (0.1-0.2 mg) by mouth 2 times daily.  - buPROPion (WELLBUTRIN XL) 300 MG 24 hr tablet; Take 1 tablet (300 mg) by mouth every morning.  - FLUoxetine (PROZAC) 20 MG capsule; Take 1 capsule (20 mg) by mouth daily.    Polyarthralgia  Stable, continue same regimen.   - Basic metabolic panel  (Ca, Cl, CO2, Creat, Gluc, K, Na, BUN); Future  - celecoxib (CELEBREX) 200 MG capsule; Take 1 capsule (200 mg) by mouth 2 times daily as needed for moderate pain.    Routine general medical examination at a health care facility  64 yr old here for Routine Wellness exam. Last wellness exam was done years ago. Discussed preventative screenings which are updated below. Counseled on immunizations and healthy lifestyle. Follow-up in 1 year for repeat physical exam     Screen for colon cancer  Due for screening.   - Colonoscopy Screening  Referral; Future    Postmenopausal status  Due for screening.   - DEXA HIP/PELVIS/SPINE - Future; Future    HX: breast cancer  Lymphadenopathy, axillary  Has noticed bump on the left breast along the sternoclavicular joint above the scar from breast cancer removal. Not sure how long she has had the lump for. Denies pain, discharge, redness, or changes with the mass. Physical exam was remarkable for tender lymph node in the left axillary region and lump that feels more bony in nature over the sternoclavicular joint line. Less concern about malignancy given exam today. But due to her history of breast cancer and LAD will send for a diagnostic mammogram and ultrasound.   - MA Diagnostic Digital  Bilateral; Future  - US Breast Left Limited 1-3 Quadrants; Future    Screening for skin cancer  Would like a referral for skin check by a dermatologist.   - Adult Dermatology  Referral; Future    CARDIOVASCULAR SCREENING; LDL GOAL LESS THAN 130  Due for screening.   - Lipid panel reflex to direct LDL Fasting; Future    Screening for diabetes mellitus  Due for screening.   - Hemoglobin A1c; Future    Counseling  Appropriate preventive services were addressed with this patient via screening, questionnaire, or discussion as appropriate for fall prevention, nutrition, physical activity, Tobacco-use cessation, social engagement, weight loss and cognition.  Checklist reviewing preventive services available has been given to the patient.  Reviewed patient's diet, addressing concerns and/or questions.   She is at risk for lack of exercise and has been provided with information to increase physical activity for the benefit of her well-being.   The patient was instructed to see the dentist every 6 months.   The patient was provided with written information regarding signs of hearing loss.   The patient's PHQ-9 score is consistent with moderate depression. She was provided with information regarding depression.       Marcellus Stoo is a 64 year old, presenting for the following:  Physical        9/30/2024    11:04 AM   Additional Questions   Roomed by Venecia OBRIEN CMA   Accompanied by Self      Health Care Directive  Patient does not have a Health Care Directive or Living Will: Discussed advance care planning with patient; information given to patient to review.    HPI      9/30/2024   General Health   How would you rate your overall physical health? Good   Feel stress (tense, anxious, or unable to sleep) Very much      (!) STRESS CONCERN      9/30/2024   Nutrition   Diet: Regular (no restrictions)            9/30/2024   Exercise   Days per week of moderate/strenous exercise 2 days      (!) EXERCISE CONCERN       9/30/2024   Social Factors   Frequency of gathering with friends or relatives Twice a week   Worry food won't last until get money to buy more No   Food not last or not have enough money for food? No   Do you have housing? (Housing is defined as stable permanent housing and does not include staying ouside in a car, in a tent, in an abandoned building, in an overnight shelter, or couch-surfing.) Yes   Are you worried about losing your housing? No   Lack of transportation? No   Unable to get utilities (heat,electricity)? No            9/30/2024   Fall Risk   Fallen 2 or more times in the past year? No   Trouble with walking or balance? No             9/30/2024   Activities of Daily Living- Home Safety   Needs help with the following daily activites None of the above   Safety concerns in the home None of the above            9/30/2024   Dental   Dentist two times every year? (!) NO            9/30/2024   Hearing Screening   Hearing concerns? (!) IT'S HARD TO FOLLOW A CONVERSATION IN A NOISY RESTAURANT OR CROWDED ROOM.    (!) TROUBLE UNDERSTANDING SOFT OR WHISPERED SPEECH.       Multiple values from one day are sorted in reverse-chronological order         9/30/2024   Driving Risk Screening   Patient/family members have concerns about driving No          9/30/2024   General Alertness/Fatigue Screening   Have you been more tired than usual lately? No          9/30/2024   Urinary Incontinence Screening   Bothered by leaking urine in past 6 months No          9/30/2024   TB Screening   Were you born outside of the [place]? No      Today's PHQ-9 Score:       9/30/2024    10:37 AM   PHQ-9 SCORE   PHQ-9 Total Score MyChart 10 (Moderate depression)   PHQ-9 Total Score 10         9/30/2024   Substance Use   Alcohol more than 3/day or more than 7/wk Not Applicable   Do you have a current opioid prescription? No   How severe/bad is pain from 1 to 10? 0/10 (No Pain)   Do you use any other substances recreationally? (!) CANNABIS  PRODUCTS        Social History     Tobacco Use    Smoking status: Former     Current packs/day: 0.00     Average packs/day: 1 pack/day for 26.0 years (26.0 ttl pk-yrs)     Types: Cigarettes     Start date: 1976     Quit date: 2002     Years since quittin.4    Smokeless tobacco: Never   Vaping Use    Vaping status: Never Used   Substance Use Topics    Alcohol use: Yes     Comment: beer daily in the evening    Drug use: Yes     Types: Marijuana     Comment: rarely           2023   LAST FHS-7 RESULTS   1st degree relative breast or ovarian cancer No   Any relative bilateral breast cancer No   Any male have breast cancer No   Any ONE woman have BOTH breast AND ovarian cancer No   Any woman with breast cancer before 50yrs No   2 or more relatives with breast AND/OR ovarian cancer No   2 or more relatives with breast AND/OR bowel cancer No              ASCVD Risk   The 10-year ASCVD risk score (William WICK, et al., 2019) is: 4.8%    Values used to calculate the score:      Age: 64 years      Sex: Female      Is Non- : No      Diabetic: No      Tobacco smoker: No      Systolic Blood Pressure: 118 mmHg      Is BP treated: No      HDL Cholesterol: 56 mg/dL      Total Cholesterol: 207 mg/dL        Reviewed and updated as needed this visit by Provider   Tobacco  Allergies  Meds  Problems  Med Hx  Surg Hx  Fam Hx            Current providers sharing in care for this patient include:  Patient Care Team:  Juvenal Rascon PA-C as PCP - General (Family Medicine)  Juvenal Rascon PA-C as Assigned PCP  Laquita Cohen LICSW as Assigned Behavioral Health Provider    The following health maintenance items are reviewed in Epic and correct as of today:  Health Maintenance   Topic Date Due    DEXA  Never done    DEPRESSION ACTION PLAN  Never done    ZOSTER IMMUNIZATION (1 of 2) Never done    COLORECTAL CANCER SCREENING  2022    GLUCOSE  2024    MAMMO SCREENING   "06/30/2024    DEPRESSION 12 MO INDEX REPEAT PHQ-9  10/14/2024    ANNUAL REVIEW OF HM ORDERS  12/20/2024    ADVANCE CARE PLANNING  01/23/2025    PHQ-9  03/30/2025    MEDICARE ANNUAL WELLNESS VISIT  09/30/2025    FALL RISK ASSESSMENT  09/30/2025    LIPID  03/19/2026    DTAP/TDAP/TD IMMUNIZATION (2 - Td or Tdap) 01/23/2030    RSV VACCINE (1 - 1-dose 75+ series) 08/18/2034    HEPATITIS C SCREENING  Completed    HIV SCREENING  Completed    INFLUENZA VACCINE  Completed    Pneumococcal Vaccine: 65+ Years  Completed    COVID-19 Vaccine  Completed    HPV IMMUNIZATION  Aged Out    MENINGITIS IMMUNIZATION  Aged Out    RSV MONOCLONAL ANTIBODY  Aged Out         Review of Systems  Constitutional, neuro, ENT, endocrine, pulmonary, cardiac, gastrointestinal, genitourinary, musculoskeletal, integument and psychiatric systems are negative, except as otherwise noted.     Objective    Exam  /78 (BP Location: Right arm, Patient Position: Sitting, Cuff Size: Adult Regular)   Pulse 54   Temp 97.7  F (36.5  C) (Tympanic)   Resp 18   Ht 1.791 m (5' 10.5\")   Wt 75.3 kg (166 lb)   LMP 05/08/2000   SpO2 98%   BMI 23.48 kg/m     Estimated body mass index is 23.48 kg/m  as calculated from the following:    Height as of this encounter: 1.791 m (5' 10.5\").    Weight as of this encounter: 75.3 kg (166 lb).    Physical Exam  GENERAL: alert and no distress  EYES: Eyes grossly normal to inspection, PERRL and conjunctivae and sclerae normal  HENT: ear canals and TM's normal, nose and mouth without ulcers or lesions  NECK: no adenopathy, no asymmetry, masses, or scars  RESP: lungs clear to auscultation - no rales, rhonchi or wheezes  BREAST: tenderness over the sternoclavicular joint, feels bony rather than tissue. Tenderness enlarged lymph node in the left axillary region.    CV: regular rate and rhythm, normal S1 S2, no S3 or S4, no murmur, click or rub, no peripheral edema  ABDOMEN: soft, nontender, no hepatosplenomegaly, no masses and " bowel sounds normal  MS: no gross musculoskeletal defects noted, no edema  SKIN: no suspicious lesions or rashes  NEURO: Normal strength and tone, mentation intact and speech normal  PSYCH: mentation appears normal, affect normal/bright         9/30/2024   Mini Cog   Clock Draw Score 2 Normal   3 Item Recall 3 objects recalled   Mini Cog Total Score 5            Seen by TERRI Medrano    Physician Attestation   I, Juvenal Rascon PA-C, was present with the medical/ADRIANE student who participated in the service and in the documentation of the note.  I have verified the history and personally performed the physical exam and medical decision making.  I agree with the assessment and plan of care as documented in the note.      Juvenal Rascon PA-C

## 2024-09-30 NOTE — TELEPHONE ENCOUNTER
Medication Question or Refill    [facility] pharmacy called with concerns about hydroxyine prescriptions?    Pending Prescriptions:                       Disp   Refills    hydrOXYzine HCl (ATARAX) 10 MG tablet     30 tab*3            Sig: Take 1 tablet (10 mg) by mouth 3 times daily as           needed for anxiety.    hydrOXYzine bhargavi (VISTARIL) 25 MG capsule  270 ca*3            Sig: TAKE 2 CAPSULES BY MOUTH 3 TIMES A DAY AS NEEDED           FOR ANXIETY ATTACK        Preferred Pharmacy:  [id number] - [place], [place] - [address] [address]  Phone: [phone] Fax: [phone]

## 2024-09-30 NOTE — PATIENT INSTRUCTIONS
Patient Education   Preventive Care Advice   This is general advice given by our system to help you stay healthy. However, your care team may have specific advice just for you. Please talk to your care team about your preventive care needs.  Nutrition  Eat 5 or more servings of fruits and vegetables each day.  Try wheat bread, brown rice and whole grain pasta (instead of white bread, rice, and pasta).  Get enough calcium and vitamin D. Check the label on foods and aim for 100% of the RDA (recommended daily allowance).  Lifestyle  Exercise at least 150 minutes each week  (30 minutes a day, 5 days a week).  Do muscle strengthening activities 2 days a week. These help control your weight and prevent disease.  No smoking.  Wear sunscreen to prevent skin cancer.  Have a dental exam and cleaning every 6 months.  Yearly exams  See your health care team every year to talk about:  Any changes in your health.  Any medicines your care team has prescribed.  Preventive care, family planning, and ways to prevent chronic diseases.  Shots (vaccines)   HPV shots (up to age 26), if you've never had them before.  Hepatitis B shots (up to age 59), if you've never had them before.  COVID-19 shot: Get this shot when it's due.  Flu shot: Get a flu shot every year.  Tetanus shot: Get a tetanus shot every 10 years.  Pneumococcal, hepatitis A, and RSV shots: Ask your care team if you need these based on your risk.  Shingles shot (for age 50 and up)  General health tests  Diabetes screening:  Starting at age 35, Get screened for diabetes at least every 3 years.  If you are younger than age 35, ask your care team if you should be screened for diabetes.  Cholesterol test: At age 39, start having a cholesterol test every 5 years, or more often if advised.  Bone density scan (DEXA): At age 50, ask your care team if you should have this scan for osteoporosis (brittle bones).  Hepatitis C: Get tested at least once in your life.  STIs (sexually  transmitted infections)  Before age 24: Ask your care team if you should be screened for STIs.  After age 24: Get screened for STIs if you're at risk. You are at risk for STIs (including HIV) if:  You are sexually active with more than one person.  You don't use condoms every time.  You or a partner was diagnosed with a sexually transmitted infection.  If you are at risk for HIV, ask about PrEP medicine to prevent HIV.  Get tested for HIV at least once in your life, whether you are at risk for HIV or not.  Cancer screening tests  Cervical cancer screening: If you have a cervix, begin getting regular cervical cancer screening tests starting at age 21.  Breast cancer scan (mammogram): If you've ever had breasts, begin having regular mammograms starting at age 40. This is a scan to check for breast cancer.  Colon cancer screening: It is important to start screening for colon cancer at age 45.  Have a colonoscopy test every 10 years (or more often if you're at risk) Or, ask your provider about stool tests like a FIT test every year or Cologuard test every 3 years.  To learn more about your testing options, visit:   .  For help making a decision, visit:   https://bit.ly/qs05267.  Prostate cancer screening test: If you have a prostate, ask your care team if a prostate cancer screening test (PSA) at age 55 is right for you.  Lung cancer screening: If you are a current or former smoker ages 50 to 80, ask your care team if ongoing lung cancer screenings are right for you.  For informational purposes only. Not to replace the advice of your health care provider. Copyright   2023 [facility] [employer]. All rights reserved. Clinically reviewed by the [facility]. [employer] [id number] - REV 01/24.  Hearing Loss: Care Instructions  Overview     Hearing loss is a sudden or slow decrease in how well you hear. It can range from slight to profound. Permanent hearing loss can occur with aging. It also can  happen when you are exposed long-term to loud noise. Examples include listening to loud music, riding motorcycles, or being around other loud machines.  Hearing loss can affect your work and home life. It can make you feel lonely or depressed. You may feel that you have lost your independence. But hearing aids and other devices can help you hear better and feel connected to others.  Follow-up care is a key part of your treatment and safety. Be sure to make and go to all appointments, and call your doctor if you are having problems. It's also a good idea to know your test results and keep a list of the medicines you take.  How can you care for yourself at home?  Avoid loud noises whenever possible. This helps keep your hearing from getting worse.  Always wear hearing protection around loud noises.  Wear a hearing aid as directed.  A professional can help you pick a hearing aid that will work best for you.  You can also get hearing aids over the counter for mild to moderate hearing loss.  Have hearing tests as your doctor suggests. They can show whether your hearing has changed. Your hearing aid may need to be adjusted.  Use other devices as needed. These may include:  Telephone amplifiers and hearing aids that can connect to a television, stereo, radio, or microphone.  Devices that use lights or vibrations. These alert you to the doorbell, a ringing telephone, or a baby monitor.  Television closed-captioning. This shows the words at the bottom of the screen. Most new TVs can do this.  TTY (text telephone). This lets you type messages back and forth on the telephone instead of talking or listening. These devices are also called TDD. When messages are typed on the keyboard, they are sent over the phone line to a receiving TTY. The message is shown on a monitor.  Use text messaging, social media, and email if it is hard for you to communicate by telephone.  Try to learn a listening technique called speechreading. It is  "not lipreading. You pay attention to people's gestures, expressions, posture, and tone of voice. These clues can help you understand what a person is saying. Face the person you are talking to, and have them face you. Make sure the lighting is good. You need to see the other person's face clearly.  Think about counseling if you need help to adjust to your hearing loss.  When should you call for help?  Watch closely for changes in your health, and be sure to contact your doctor if:    You think your hearing is getting worse.     You have new symptoms, such as dizziness or nausea.   Where can you learn more?  Go to https://www.Eyestorm.net/patiented  Enter R798 in the search box to learn more about \"Hearing Loss: Care Instructions.\"  Current as of: September 27, 2023               Content Version: 14.0    7169-5165 [employer].   Care instructions adapted under license by your healthcare professional. If you have questions about a medical condition or this instruction, always ask your healthcare professional. [employer] disclaims any warranty or liability for your use of this information.      Learning About Stress  What is stress?     Stress is your body's response to a hard situation. Your body can have a physical, emotional, or mental response. Stress is a fact of life for most people, and it affects everyone differently. What causes stress for you may not be stressful for someone else.  A lot of things can cause stress. You may feel stress when you go on a job interview, take a test, or run a race. This kind of short-term stress is normal and even useful. It can help you if you need to work hard or react quickly. For example, stress can help you finish an important job on time.  Long-term stress is caused by ongoing stressful situations or events. Examples of long-term stress include long-term health problems, ongoing problems at work, or conflicts in your family. Long-term stress can " harm your health.  How does stress affect your health?  When you are stressed, your body responds as though you are in danger. It makes hormones that speed up your heart, make you breathe faster, and give you a burst of energy. This is called the fight-or-flight stress response. If the stress is over quickly, your body goes back to normal and no harm is done.  But if stress happens too often or lasts too long, it can have bad effects. Long-term stress can make you more likely to get sick, and it can make symptoms of some diseases worse. If you tense up when you are stressed, you may develop neck, shoulder, or low back pain. Stress is linked to high blood pressure and heart disease.  Stress also harms your emotional health. It can make you pittman, tense, or depressed. Your relationships may suffer, and you may not do well at work or school.  What can you do to manage stress?  You can try these things to help manage stress:   Do something active. Exercise or activity can help reduce stress. Walking is a great way to get started. Even everyday activities such as housecleaning or yard work can help.  Try yoga or amor chi. These techniques combine exercise and meditation. You may need some training at first to learn them.  Do something you enjoy. For example, listen to music or go to a movie. Practice your hobby or do volunteer work.  Meditate. This can help you relax, because you are not worrying about what happened before or what may happen in the future.  Do guided imagery. Imagine yourself in any setting that helps you feel calm. You can use online videos, books, or a teacher to guide you.  Do breathing exercises. For example:  From a standing position, bend forward from the waist with your knees slightly bent. Let your arms dangle close to the floor.  Breathe in slowly and deeply as you return to a standing position. Roll up slowly and lift your head last.  Hold your breath for just a few seconds in the standing  "position.  Breathe out slowly and bend forward from the waist.  Let your feelings out. Talk, laugh, cry, and express anger when you need to. Talking with supportive friends or family, a counselor, or a theo leader about your feelings is a healthy way to relieve stress. Avoid discussing your feelings with people who make you feel worse.  Write. It may help to write about things that are bothering you. This helps you find out how much stress you feel and what is causing it. When you know this, you can find better ways to cope.  What can you do to prevent stress?  You might try some of these things to help prevent stress:  Manage your time. This helps you find time to do the things you want and need to do.  Get enough sleep. Your body recovers from the stresses of the day while you are sleeping.  Get support. Your family, friends, and community can make a difference in how you experience stress.  Limit your news feed. Avoid or limit time on social media or news that may make you feel stressed.  Do something active. Exercise or activity can help reduce stress. Walking is a great way to get started.  Where can you learn more?  Go to https://www.Everplaces.net/patiented  Enter N032 in the search box to learn more about \"Learning About Stress.\"  Current as of: October 24, 2023               Content Version: 14.0    9066-3579 [employer].   Care instructions adapted under license by your healthcare professional. If you have questions about a medical condition or this instruction, always ask your healthcare professional. [employer] disclaims any warranty or liability for your use of this information.      Learning About Depression Screening  What is depression screening?  Depression screening is a way to see if you have depression symptoms. It may be done by a doctor or counselor. It's often part of a routine checkup. That's because your mental health is just as important as your physical " "health.  Depression is a mental health condition that affects how you feel, think, and act. You may:  Have less energy.  Lose interest in your daily activities.  Feel sad and grouchy for a long time.  Depression is very common. It affects people of all ages.  Many things can lead to depression. Some people become depressed after they have a stroke or find out they have a major illness like cancer or heart disease. The death of a loved one or a breakup may lead to depression. It can run in families. Most experts believe that a combination of inherited genes and stressful life events can cause it.  What happens during screening?  You may be asked to fill out a form about your depression symptoms. You and the doctor will discuss your answers. The doctor may ask you more questions to learn more about how you think, act, and feel.  What happens after screening?  If you have symptoms of depression, your doctor will talk to you about your options.  Doctors usually treat depression with medicines or counseling. Often, combining the two works best. Many people don't get help because they think that they'll get over the depression on their own. But people with depression may not get better unless they get treatment.  The cause of depression is not well understood. There may be many factors involved. But if you have depression, it's not your fault.  A serious symptom of depression is thinking about death or suicide. If you or someone you care about talks about this or about feeling hopeless, get help right away.  It's important to know that depression can be treated. Medicine, counseling, and self-care may help.  Where can you learn more?  Go to https://www.D1G.net/patiented  Enter T185 in the search box to learn more about \"Learning About Depression Screening.\"  Current as of: June 24, 2023  Content Version: 14.1    4102-5644 [employer].   Care instructions adapted under license by your [facility] " professional. If you have questions about a medical condition or this instruction, always ask your healthcare professional. [employer] disclaims any warranty or liability for your use of this information.

## 2024-10-01 RX ORDER — HYDROXYZINE PAMOATE 25 MG/1
50-100 CAPSULE ORAL
Qty: 180 CAPSULE | Refills: 3 | Status: SHIPPED | OUTPATIENT
Start: 2024-10-01

## 2024-10-01 NOTE — TELEPHONE ENCOUNTER
Yup, its a new med so she is going to see how it  goes and will let me know if she needs a refill. Its just as needed, so I am confused on why they are questioning the sig.     Juvenal Rascon PA-C

## 2024-10-01 NOTE — TELEPHONE ENCOUNTER
Spoke with pharmacy and clarified the 10MG script.     Pharmacy states that they need an RX for the 25MG to state the take at night. The current RX has patient taking medication 3 times a day.

## 2024-10-01 NOTE — TELEPHONE ENCOUNTER
Notified pharmacy.     Pharmacy asking if one of the directions are wrong for the RX? Pharmacy states the hydroxyzine 10MG tablet is a new RX and the directions are to take 1 tablet 3 times daily PRN with a quantity of 30 prescribed.

## 2024-10-01 NOTE — TELEPHONE ENCOUNTER
Please let the pharmacy know that 10 mg is used during the day, and 25 mg is used at night.     Juvenal Rascon PA-C

## 2024-10-19 ENCOUNTER — HEALTH MAINTENANCE LETTER (OUTPATIENT)
Age: 64
End: 2024-10-19

## 2024-11-05 ENCOUNTER — TELEPHONE (OUTPATIENT)
Dept: GASTROENTEROLOGY | Facility: CLINIC | Age: 64
End: 2024-11-05
Payer: COMMERCIAL

## 2024-11-05 NOTE — TELEPHONE ENCOUNTER
"Endoscopy Scheduling Screen      What insurance is in the chart?  Other:  [facility]    Ordering/Referring Provider: Juvenal Rascon   (If ordering provider performs procedure, schedule with ordering provider unless otherwise instructed. )    BMI: There is no height or weight on file to calculate BMI.  23.48    Sedation Ordered  general anesthesia.   BMI<= 45 45 < BMI <= 48 48 < BMI < = 50  BMI > 50   No Restrictions No [facility]  No [facility]  [facility] with exceptions Hospital Only OR Only       Do you have a history of malignant hyperthermia?  No    (Females) Are you currently pregnant?   No     Have you been diagnosed or told you have pulmonary hypertension?   No    Do you have any heart conditions?  No     Do you have an LVAD?  No    Have you been told you have moderate to severe sleep apnea?  No.    Have you been told you have COPD, asthma, or any other lung disease?  No    Have you ever had or are you waiting for an organ transplant?  No. Continue scheduling, no site restrictions.    Have you had a stroke or transient ischemic attack (TIA aka \"mini stroke\" in the last 6 months?   No    Have you been diagnosed with or been told you have cirrhosis of the liver?   No.    Are you currently on dialysis?   No    Do you need assistance transferring?   No    BMI: There is no height or weight on file to calculate BMI.     Is patients BMI > 50?  No    BMI > 40?  No    Do you have a diagnosis of diabetes?  No    Do you take an injectable medication for weight loss or diabetes (excluding insulin)?  No    Do you take the medication Naltrexone?  No    Do you take blood thinners?  No     Prep   Are you currently on dialysis or do you have chronic kidney disease?  No    Do you have a diagnosis of cystic fibrosis (CF)?  No    On a regular basis do you go 3 -5 days between bowel movements?  No    Preferred Pharmacy:  [facility]  No Pharmacies Listed    Final Scheduling Details     Procedure " scheduled  Colonoscopy    Surgeon:  Leodan     Date of procedure:  1-13-25     Location  [place] - Patient preference.    What is your communication preference for Instructions and/or Bowel Prep?   [employer]    Patient Reminders:    You will receive a call from a Nurse to review instructions and health history.  This assessment must be completed prior to your procedure.  Failure to complete the Nurse assessment may result in the procedure being cancelled.       On the day of your procedure, please designate an adult(s) who can drive you home stay with you for the next 24 hours. The medicines used in the exam will make you sleepy. You will not be able to drive.       You cannot take public transportation, ride share services, or non-medical taxi service without a responsible caregiver.  Medical transport services are allowed with the requirement that a responsible caregiver will receive you at your destination.  We require that drivers and caregivers are confirmed prior to your procedure.

## 2024-11-06 ENCOUNTER — APPOINTMENT (OUTPATIENT)
Dept: URBAN - METROPOLITAN AREA CLINIC 256 | Age: 64
Setting detail: DERMATOLOGY
End: 2024-11-06

## 2024-11-06 VITALS — WEIGHT: 165 LBS | HEIGHT: 70.5 IN

## 2024-11-06 DIAGNOSIS — L57.0 ACTINIC KERATOSIS: ICD-10-CM

## 2024-11-06 DIAGNOSIS — B35.1 TINEA UNGUIUM: ICD-10-CM

## 2024-11-06 DIAGNOSIS — Z71.89 OTHER SPECIFIED COUNSELING: ICD-10-CM

## 2024-11-06 DIAGNOSIS — L82.1 OTHER SEBORRHEIC KERATOSIS: ICD-10-CM

## 2024-11-06 DIAGNOSIS — D18.0 HEMANGIOMA: ICD-10-CM

## 2024-11-06 DIAGNOSIS — D49.2 NEOPLASM OF UNSPECIFIED BEHAVIOR OF BONE, SOFT TISSUE, AND SKIN: ICD-10-CM

## 2024-11-06 DIAGNOSIS — D22 MELANOCYTIC NEVI: ICD-10-CM

## 2024-11-06 DIAGNOSIS — L57.8 OTHER SKIN CHANGES DUE TO CHRONIC EXPOSURE TO NONIONIZING RADIATION: ICD-10-CM

## 2024-11-06 PROBLEM — D22.5 MELANOCYTIC NEVI OF TRUNK: Status: ACTIVE | Noted: 2024-11-06

## 2024-11-06 PROBLEM — D18.01 HEMANGIOMA OF SKIN AND SUBCUTANEOUS TISSUE: Status: ACTIVE | Noted: 2024-11-06

## 2024-11-06 PROCEDURE — 11102 TANGNTL BX SKIN SINGLE LES: CPT

## 2024-11-06 PROCEDURE — OTHER LIQUID NITROGEN: OTHER

## 2024-11-06 PROCEDURE — OTHER MIPS QUALITY: OTHER

## 2024-11-06 PROCEDURE — 99204 OFFICE O/P NEW MOD 45 MIN: CPT | Mod: 25

## 2024-11-06 PROCEDURE — OTHER SEPARATE AND IDENTIFIABLE DOCUMENTATION: OTHER

## 2024-11-06 PROCEDURE — OTHER BIOPSY BY SHAVE METHOD: OTHER

## 2024-11-06 PROCEDURE — OTHER COUNSELING: OTHER

## 2024-11-06 PROCEDURE — 17003 DESTRUCT PREMALG LES 2-14: CPT

## 2024-11-06 PROCEDURE — OTHER PRESCRIPTION: OTHER

## 2024-11-06 PROCEDURE — OTHER SUNSCREEN RECOMMENDATIONS: OTHER

## 2024-11-06 PROCEDURE — OTHER PRESCRIPTION MEDICATION MANAGEMENT: OTHER

## 2024-11-06 PROCEDURE — OTHER EDUCATIONAL RESOURCES PROVIDED: OTHER

## 2024-11-06 PROCEDURE — 17000 DESTRUCT PREMALG LESION: CPT | Mod: 59

## 2024-11-06 PROCEDURE — OTHER PHOTO-DOCUMENTATION: OTHER

## 2024-11-06 RX ORDER — CICLOPIROX 80 MG/ML
SOLUTION TOPICAL
Qty: 6.6 | Refills: 5 | Status: ERX | COMMUNITY
Start: 2024-11-06

## 2024-11-06 ASSESSMENT — LOCATION DETAILED DESCRIPTION DERM
LOCATION DETAILED: SUPERIOR LUMBAR SPINE
LOCATION DETAILED: LEFT GREAT TOENAIL
LOCATION DETAILED: SUPERIOR THORACIC SPINE
LOCATION DETAILED: RIGHT RADIAL DORSAL HAND
LOCATION DETAILED: LEFT PROXIMAL RADIAL DORSAL FOREARM
LOCATION DETAILED: EPIGASTRIC SKIN
LOCATION DETAILED: LEFT LATERAL EYEBROW

## 2024-11-06 ASSESSMENT — LOCATION ZONE DERM
LOCATION ZONE: ARM
LOCATION ZONE: FACE
LOCATION ZONE: TOENAIL
LOCATION ZONE: TRUNK
LOCATION ZONE: HAND

## 2024-11-06 ASSESSMENT — LOCATION SIMPLE DESCRIPTION DERM
LOCATION SIMPLE: LEFT GREAT TOE
LOCATION SIMPLE: LOWER BACK
LOCATION SIMPLE: UPPER BACK
LOCATION SIMPLE: ABDOMEN
LOCATION SIMPLE: LEFT EYEBROW
LOCATION SIMPLE: LEFT FOREARM
LOCATION SIMPLE: RIGHT HAND

## 2024-11-06 NOTE — PROCEDURE: BIOPSY BY SHAVE METHOD
Detail Level: Detailed
Depth Of Biopsy: dermis
Was A Bandage Applied: Yes
Size Of Lesion In Cm: 2.3
X Size Of Lesion In Cm: 1.5
Biopsy Type: H and E
Biopsy Method: Personna blade
Anesthesia Type: 1% Xylocaine with 1:200,000 epinephrine and sodium bicarbonate
Anesthesia Volume In Cc: 0.5
Additional Anesthesia Volume In Cc (Will Not Render If 0): 0
Hemostasis: Drysol
Wound Care: Petrolatum
Dressing: bandage
Destruction After The Procedure: No
Type Of Destruction Used: Curettage
Curettage Text: The wound bed was treated with curettage after the biopsy was performed.
Cryotherapy Text: The wound bed was treated with cryotherapy after the biopsy was performed.
Electrodesiccation Text: The wound bed was treated with electrodesiccation after the biopsy was performed.
Electrodesiccation And Curettage Text: The wound bed was treated with electrodesiccation and curettage after the biopsy was performed.
Silver Nitrate Text: The wound bed was treated with silver nitrate after the biopsy was performed.
Lab: -[id number]
Path Notes (To The Dermatopathologist): Please check margins.
Consent: Written consent was obtained and risks were reviewed including but not limited to scarring, infection, bleeding, scabbing, incomplete removal, nerve damage and allergy to anesthesia.
Post-Care Instructions: I reviewed with the patient in detail post-care instructions. Patient is to keep the biopsy site dry overnight, and then apply bacitracin twice daily until healed.
Notification Instructions: Patient will be notified of biopsy results. However, patient instructed to call the office if not contacted within 2 weeks.
Billing Type: Third-Party Bill
Information: Selecting Yes will display possible errors in your note based on the variables you have selected. This validation is only offered as a suggestion for you. PLEASE NOTE THAT THE VALIDATION TEXT WILL BE REMOVED WHEN YOU FINALIZE YOUR NOTE. IF YOU WANT TO FAX A PRELIMINARY NOTE YOU WILL NEED TO TOGGLE THIS TO 'NO' IF YOU DO NOT WANT IT IN YOUR FAXED NOTE.

## 2024-11-06 NOTE — PROCEDURE: PRESCRIPTION MEDICATION MANAGEMENT
Initiate Treatment: Apply ciclopirox 8% topical solution to affected nails once daily until clear.
Plan: RFs ok for 1 year as needed.
Render In Strict Bullet Format?: No
Detail Level: Zone

## 2024-11-06 NOTE — PROCEDURE: LIQUID NITROGEN
Render Post-Care Instructions In Note?: no
Consent: The patient's consent was obtained including but not limited to risks of crusting, scabbing, blistering, scarring, darker or lighter pigmentary change, recurrence, incomplete removal and infection.
Detail Level: Detailed
Show Aperture Variable?: Yes
Application Tool (Optional): Liquid Nitrogen Sprayer
Number Of Freeze-Thaw Cycles: 1 freeze-thaw cycle
Post-Care Instructions: I reviewed with the patient in detail post-care instructions. Patient is to wear sunprotection, and avoid picking at any of the treated lesions. Pt may apply Vaseline to crusted or scabbing areas.
Duration Of Freeze Thaw-Cycle (Seconds): 6

## 2024-11-13 ENCOUNTER — LAB (OUTPATIENT)
Dept: LAB | Facility: CLINIC | Age: 64
End: 2024-11-13
Attending: PHYSICIAN ASSISTANT
Payer: COMMERCIAL

## 2024-11-13 ENCOUNTER — APPOINTMENT (OUTPATIENT)
Dept: URBAN - METROPOLITAN AREA CLINIC 256 | Age: 64
Setting detail: DERMATOLOGY
End: 2024-11-14

## 2024-11-13 VITALS — WEIGHT: 160 LBS | HEIGHT: 71 IN

## 2024-11-13 DIAGNOSIS — R79.89 ELEVATED SERUM CREATININE: ICD-10-CM

## 2024-11-13 PROBLEM — D04.62 CARCINOMA IN SITU OF SKIN OF LEFT UPPER LIMB, INCLUDING SHOULDER: Status: ACTIVE | Noted: 2024-11-13

## 2024-11-13 LAB
ALBUMIN SERPL BCG-MCNC: 4.3 G/DL (ref 3.5–5.2)
ALP SERPL-CCNC: 81 U/L (ref 40–150)
ALT SERPL W P-5'-P-CCNC: 10 U/L (ref 0–50)
ANION GAP SERPL CALCULATED.3IONS-SCNC: 9 MMOL/L (ref 7–15)
AST SERPL W P-5'-P-CCNC: 18 U/L (ref 0–45)
BILIRUB SERPL-MCNC: 0.4 MG/DL
BUN SERPL-MCNC: 11.1 MG/DL (ref 8–23)
CALCIUM SERPL-MCNC: 9 MG/DL (ref 8.8–10.4)
CHLORIDE SERPL-SCNC: 99 MMOL/L (ref 98–107)
CREAT SERPL-MCNC: 0.98 MG/DL (ref 0.51–0.95)
EGFRCR SERPLBLD CKD-EPI 2021: 64 ML/MIN/1.73M2
ERYTHROCYTE [DISTWIDTH] IN BLOOD BY AUTOMATED COUNT: 12.6 % (ref 10–15)
GLUCOSE SERPL-MCNC: 97 MG/DL (ref 70–99)
HCO3 SERPL-SCNC: 26 MMOL/L (ref 22–29)
HCT VFR BLD AUTO: 37.8 % (ref 35–47)
HGB BLD-MCNC: 12.8 G/DL (ref 11.7–15.7)
MCH RBC QN AUTO: 29.6 PG (ref 26.5–33)
MCHC RBC AUTO-ENTMCNC: 33.9 G/DL (ref 31.5–36.5)
MCV RBC AUTO: 87 FL (ref 78–100)
PLATELET # BLD AUTO: 293 10E3/UL (ref 150–450)
POTASSIUM SERPL-SCNC: 4.5 MMOL/L (ref 3.4–5.3)
PROT SERPL-MCNC: 6.8 G/DL (ref 6.4–8.3)
RBC # BLD AUTO: 4.33 10E6/UL (ref 3.8–5.2)
SODIUM SERPL-SCNC: 134 MMOL/L (ref 135–145)
WBC # BLD AUTO: 7.4 10E3/UL (ref 4–11)

## 2024-11-13 PROCEDURE — 80053 COMPREHEN METABOLIC PANEL: CPT

## 2024-11-13 PROCEDURE — OTHER EDUCATIONAL RESOURCES PROVIDED: OTHER

## 2024-11-13 PROCEDURE — OTHER PATIENT SPECIFIC COUNSELING: OTHER

## 2024-11-13 PROCEDURE — OTHER PHOTO-DOCUMENTATION: OTHER

## 2024-11-13 PROCEDURE — 17263 DSTRJ MAL LES T/A/L 2.1-3.0: CPT | Mod: 79

## 2024-11-13 PROCEDURE — OTHER MIPS QUALITY: OTHER

## 2024-11-13 PROCEDURE — 36415 COLL VENOUS BLD VENIPUNCTURE: CPT

## 2024-11-13 PROCEDURE — OTHER CURETTAGE AND DESTRUCTION: OTHER

## 2024-11-13 PROCEDURE — 85027 COMPLETE CBC AUTOMATED: CPT

## 2024-11-13 PROCEDURE — OTHER COUNSELING: OTHER

## 2024-11-13 NOTE — PROCEDURE: CURETTAGE AND DESTRUCTION
Biopsy Photograph Reviewed: Yes
Bill For Surgical Tray: no
Additional Information: (Optional): The wound was cleaned, and a pressure dressing was applied.  The patient received detailed post-op instructions.
Concentration (Mg/Ml Or Millions Of Plaque Forming Units/Cc): 0.01
Bill As A Line Item Or As Units: Line Item
Cautery Type: electrodesiccation
Accession # (Optional): [id number]
Anesthesia Volume In Cc: 2
Size Of Lesion After Curettage: 2.2
Number Of Curettages: 3
Final Size Statement: The size of the lesion after curettage was
What Was Performed First?: Curettage
Total Volume (Ccs): 1
Anesthesia Type: 1% lidocaine with epinephrine
Detail Level: Detailed
Consent was obtained from the patient. The risks, benefits and alternatives to therapy were discussed in detail. Specifically, the risks of infection, scarring, bleeding, prolonged wound healing, nerve injury, incomplete removal, allergy to anesthesia and recurrence were addressed. Alternatives to ED&C, such as: surgical removal and XRT were also discussed.  Prior to the procedure, the treatment site was clearly identified and confirmed by the patient.
Post-Care Instructions: I reviewed with the patient in detail post-care instructions. Patient is to keep the area dry for 48 hours, and not to engage in any swimming until the area is healed. Should the patient develop any fevers, chills, bleeding, severe pain patient will contact the office immediately.

## 2024-11-13 NOTE — PROCEDURE: PATIENT SPECIFIC COUNSELING
Detail Level: Zone
-Discussed Aldara as alternative treatment option and patient preferred to do ED&C. \\n-Due to large size, recommended recheck in 2 months to ensure no evidence of recurrence.

## 2024-11-15 ENCOUNTER — MYC REFILL (OUTPATIENT)
Dept: FAMILY MEDICINE | Facility: CLINIC | Age: 64
End: 2024-11-15
Payer: COMMERCIAL

## 2024-11-15 DIAGNOSIS — F41.9 ANXIETY: ICD-10-CM

## 2024-11-15 DIAGNOSIS — F43.21 GRIEF REACTION: ICD-10-CM

## 2024-11-18 RX ORDER — HYDROXYZINE HYDROCHLORIDE 10 MG/1
10 TABLET, FILM COATED ORAL 3 TIMES DAILY PRN
Qty: 30 TABLET | Refills: 3 | Status: SHIPPED | OUTPATIENT
Start: 2024-11-18

## 2024-11-29 ENCOUNTER — HOSPITAL ENCOUNTER (OUTPATIENT)
Dept: ULTRASOUND IMAGING | Facility: CLINIC | Age: 64
Discharge: HOME | End: 2024-11-29
Attending: PHYSICIAN ASSISTANT
Payer: COMMERCIAL

## 2024-11-29 ENCOUNTER — HOSPITAL ENCOUNTER (OUTPATIENT)
Dept: MAMMOGRAPHY | Facility: CLINIC | Age: 64
Discharge: HOME | End: 2024-11-29
Attending: PHYSICIAN ASSISTANT
Payer: COMMERCIAL

## 2024-11-29 DIAGNOSIS — Z85.3 HX: BREAST CANCER: ICD-10-CM

## 2024-11-29 DIAGNOSIS — R59.0 LYMPHADENOPATHY, AXILLARY: ICD-10-CM

## 2024-11-29 PROCEDURE — 77066 DX MAMMO INCL CAD BI: CPT

## 2024-11-29 PROCEDURE — 76642 ULTRASOUND BREAST LIMITED: CPT | Mod: LT

## 2024-12-18 ENCOUNTER — MYC REFILL (OUTPATIENT)
Dept: FAMILY MEDICINE | Facility: CLINIC | Age: 64
End: 2024-12-18
Payer: MEDICARE

## 2024-12-18 DIAGNOSIS — F33.1 MODERATE EPISODE OF RECURRENT MAJOR DEPRESSIVE DISORDER (H): ICD-10-CM

## 2024-12-18 DIAGNOSIS — F41.9 ANXIETY: ICD-10-CM

## 2024-12-18 DIAGNOSIS — F43.21 GRIEF REACTION: ICD-10-CM

## 2024-12-18 RX ORDER — CLONIDINE HYDROCHLORIDE 0.1 MG/1
0.1-0.2 TABLET ORAL 2 TIMES DAILY
Qty: 270 TABLET | Refills: 3 | OUTPATIENT
Start: 2024-12-18

## 2024-12-18 RX ORDER — HYDROXYZINE HYDROCHLORIDE 10 MG/1
10 TABLET, FILM COATED ORAL 3 TIMES DAILY PRN
Qty: 30 TABLET | Refills: 3 | OUTPATIENT
Start: 2024-12-18

## 2025-01-10 ENCOUNTER — ANESTHESIA EVENT (OUTPATIENT)
Dept: GASTROENTEROLOGY | Facility: CLINIC | Age: 65
End: 2025-01-10
Payer: MEDICARE

## 2025-01-10 ASSESSMENT — LIFESTYLE VARIABLES: TOBACCO_USE: 1

## 2025-01-10 NOTE — ANESTHESIA PREPROCEDURE EVALUATION
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     BACK SURGERY      lumbar fusion    BIOPSY      BREAST SURGERY      Left breast lumpectomy,     COLONOSCOPY  2012    Procedure:COLONOSCOPY; Surgeon:CASEY JOHNSON; Location:[facility]    GYN SURGERY      tubal ligation    HEMORRHOIDECTOMY EXTERNAL  2012    Procedure:HEMORRHOIDECTOMY EXTERNAL; HEMORRHOIDECTOMY, PROCTOPLASTY, PARTIAL INTERNAL SPHINCTEROTOMY, INTEROPERATIVE COLONOSCOPY; Surgeon:CASEY JOHNSON; Location:[facility]    HYSTERECTOMY, CESARIO      hysterectomy    ORTHOPEDIC SURGERY      PROCTOPLASTY  2012    Procedure:PROCTOPLASTY; Surgeon:CASEY JOHNSON; Location:[facility]    SPHINCTEROTOMY RECTUM  2012    Procedure:SPHINCTEROTOMY RECTUM; Surgeon:CASEY JOHNSON; Location:[facility]      Allergies   Allergen Reactions    Adhesive Tape Swelling     STERISTRIPS    Amoxicillin Diarrhea      Social History     Tobacco Use    Smoking status: Former     Current packs/day: 0.00     Average packs/day: 1 pack/day for 26.0 years (26.0 ttl pk-yrs)     Types: Cigarettes     Start date: 1976     Quit date: 2002     Years since quittin.7    Smokeless tobacco: Never   Substance Use Topics    Alcohol use: Yes     Comment: beer daily in the evening      Wt Readings from Last 1 Encounters:   24 75.3 kg (166 lb)        Anesthesia Evaluation        History of anesthetic complications  - PONV.      ROS/MED HX  ENT/Pulmonary:  Comment: Pulmonary nodules    (+)                tobacco use, Past use,                       Neurologic:  - neg neurologic ROS     Cardiovascular:  - neg cardiovascular ROS   (+)  - -   -  - -                                 Previous cardiac testing   Echo: Date: Results:    Stress Test:  Date: Results:    ECG Reviewed:  Date: 6/18/21 Results:  Sinus  Rhythm   -RSR(V1) -nondiagnostic.    -Left atrial enlargement.     BORDERLINE    Cath:  Date: Results:      METS/Exercise Tolerance:     Hematologic:  - neg hematologic  ROS     Musculoskeletal:  - neg musculoskeletal ROS     GI/Hepatic:  - neg GI/hepatic ROS     Renal/Genitourinary:  - neg Renal ROS     Endo:  - neg endo ROS     Psychiatric/Substance Use:     (+) psychiatric history depression alcohol abuse  Recreational drug usage: Cannabis.    Infectious Disease:  - neg infectious disease ROS     Malignancy:   (+) Malignancy, History of Breast.    Other:  - neg other ROS          Physical Exam    Airway        Mallampati: II   TM distance: > 3 FB   Neck ROM: full   Mouth opening: > 3 cm    Respiratory Devices and Support         Dental       (+) Minor Abnormalities - some fillings, tiny chips      Cardiovascular   cardiovascular exam normal          Pulmonary   pulmonary exam normal                OUTSIDE LABS:  CBC:   Lab Results   Component Value Date    WBC 7.4 11/13/2024    WBC 8.4 06/18/2021    HGB 12.8 11/13/2024    HGB 13.4 06/18/2021    HCT 37.8 11/13/2024    HCT 40.4 06/18/2021     11/13/2024     06/18/2021     BMP:   Lab Results   Component Value Date     (L) 11/13/2024     (L) 09/30/2024    POTASSIUM 4.5 11/13/2024    POTASSIUM 4.2 09/30/2024    CHLORIDE 99 11/13/2024    CHLORIDE 96 (L) 09/30/2024    CO2 26 11/13/2024    CO2 26 09/30/2024    BUN 11.1 11/13/2024    BUN 7.8 (L) 09/30/2024    CR 0.98 (H) 11/13/2024    CR 0.99 (H) 09/30/2024    GLC 97 11/13/2024    GLC 91 09/30/2024     COAGS:   Lab Results   Component Value Date     "INR 0.96 07/07/2011     POC: No results found for: \"BGM\", \"HCG\", \"HCGS\"  HEPATIC:   Lab Results   Component Value Date    ALBUMIN 4.3 11/13/2024    PROTTOTAL 6.8 11/13/2024    ALT 10 11/13/2024    AST 18 11/13/2024    ALKPHOS 81 11/13/2024    BILITOTAL 0.4 11/13/2024     OTHER:   Lab Results   Component Value Date    A1C 5.5 09/30/2024    YOBANY 9.0 11/13/2024    TSH 3.34 11/06/2007    T4 0.88 11/06/2007       Anesthesia Plan    ASA Status:  2       Anesthesia Type: General.     - Airway: Native airway              Consents    Anesthesia Plan(s) and associated risks, benefits, and realistic alternatives discussed. Questions answered and patient/representative(s) expressed understanding.     - Discussed: Risks, Benefits and Alternatives for BOTH SEDATION and the PROCEDURE were discussed     - Discussed with:  Patient      - Extended Intubation/Ventilatory Support Discussed: No.      - Patient is DNR/DNI Status: No     Use of blood products discussed: No .     Postoperative Care            Comments:               Jorge Singer, APRN CRNA    I have reviewed the pertinent notes and labs in the chart from the past 30 days and (re)examined the patient.  Any updates or changes from those notes are reflected in this note.                                   "

## 2025-01-13 ENCOUNTER — ANESTHESIA (OUTPATIENT)
Dept: GASTROENTEROLOGY | Facility: CLINIC | Age: 65
End: 2025-01-13
Payer: MEDICARE

## 2025-01-13 ENCOUNTER — HOSPITAL ENCOUNTER (OUTPATIENT)
Facility: CLINIC | Age: 65
Discharge: HOME | End: 2025-01-13
Attending: SURGERY | Admitting: SURGERY
Payer: MEDICARE

## 2025-01-13 VITALS
TEMPERATURE: 98.2 F | RESPIRATION RATE: 15 BRPM | SYSTOLIC BLOOD PRESSURE: 111 MMHG | WEIGHT: 166 LBS | HEIGHT: 70 IN | DIASTOLIC BLOOD PRESSURE: 84 MMHG | BODY MASS INDEX: 23.77 KG/M2 | OXYGEN SATURATION: 97 % | HEART RATE: 81 BPM

## 2025-01-13 LAB — COLONOSCOPY: NORMAL

## 2025-01-13 PROCEDURE — 45385 COLONOSCOPY W/LESION REMOVAL: CPT | Performed by: SURGERY

## 2025-01-13 PROCEDURE — 250N000009 HC RX 250

## 2025-01-13 PROCEDURE — 88305 TISSUE EXAM BY PATHOLOGIST: CPT | Mod: TC | Performed by: SURGERY

## 2025-01-13 PROCEDURE — 370N000017 HC ANESTHESIA TECHNICAL FEE, PER MIN: Performed by: SURGERY

## 2025-01-13 PROCEDURE — 250N000011 HC RX IP 250 OP 636

## 2025-01-13 RX ORDER — NALOXONE HYDROCHLORIDE 0.4 MG/ML
0.4 INJECTION, SOLUTION INTRAMUSCULAR; INTRAVENOUS; SUBCUTANEOUS
Status: DISCONTINUED | OUTPATIENT
Start: 2025-01-13 | End: 2025-01-13 | Stop reason: HOSPADM

## 2025-01-13 RX ORDER — ONDANSETRON 2 MG/ML
4 INJECTION INTRAMUSCULAR; INTRAVENOUS EVERY 30 MIN PRN
Status: DISCONTINUED | OUTPATIENT
Start: 2025-01-13 | End: 2025-01-13 | Stop reason: HOSPADM

## 2025-01-13 RX ORDER — ONDANSETRON 4 MG/1
4 TABLET, ORALLY DISINTEGRATING ORAL EVERY 30 MIN PRN
Status: DISCONTINUED | OUTPATIENT
Start: 2025-01-13 | End: 2025-01-13 | Stop reason: HOSPADM

## 2025-01-13 RX ORDER — LIDOCAINE 40 MG/G
CREAM TOPICAL
Status: DISCONTINUED | OUTPATIENT
Start: 2025-01-13 | End: 2025-01-13 | Stop reason: HOSPADM

## 2025-01-13 RX ORDER — ONDANSETRON 2 MG/ML
4 INJECTION INTRAMUSCULAR; INTRAVENOUS
Status: DISCONTINUED | OUTPATIENT
Start: 2025-01-13 | End: 2025-01-13 | Stop reason: HOSPADM

## 2025-01-13 RX ORDER — NALOXONE HYDROCHLORIDE 0.4 MG/ML
0.2 INJECTION, SOLUTION INTRAMUSCULAR; INTRAVENOUS; SUBCUTANEOUS
Status: DISCONTINUED | OUTPATIENT
Start: 2025-01-13 | End: 2025-01-13 | Stop reason: HOSPADM

## 2025-01-13 RX ORDER — LIDOCAINE HYDROCHLORIDE 20 MG/ML
INJECTION, SOLUTION INFILTRATION; PERINEURAL PRN
Status: DISCONTINUED | OUTPATIENT
Start: 2025-01-13 | End: 2025-01-13

## 2025-01-13 RX ORDER — PROPOFOL 10 MG/ML
INJECTION, EMULSION INTRAVENOUS CONTINUOUS PRN
Status: DISCONTINUED | OUTPATIENT
Start: 2025-01-13 | End: 2025-01-13

## 2025-01-13 RX ORDER — GLYCOPYRROLATE 0.2 MG/ML
INJECTION, SOLUTION INTRAMUSCULAR; INTRAVENOUS PRN
Status: DISCONTINUED | OUTPATIENT
Start: 2025-01-13 | End: 2025-01-13

## 2025-01-13 RX ORDER — FLUMAZENIL 0.1 MG/ML
0.2 INJECTION, SOLUTION INTRAVENOUS
Status: DISCONTINUED | OUTPATIENT
Start: 2025-01-13 | End: 2025-01-13 | Stop reason: HOSPADM

## 2025-01-13 RX ORDER — PROPOFOL 10 MG/ML
INJECTION, EMULSION INTRAVENOUS PRN
Status: DISCONTINUED | OUTPATIENT
Start: 2025-01-13 | End: 2025-01-13

## 2025-01-13 RX ADMIN — LIDOCAINE HYDROCHLORIDE 100 MG: 20 INJECTION, SOLUTION INFILTRATION; PERINEURAL at 11:08

## 2025-01-13 RX ADMIN — PROPOFOL 200 MCG/KG/MIN: 10 INJECTION, EMULSION INTRAVENOUS at 11:08

## 2025-01-13 RX ADMIN — GLYCOPYRROLATE 0.2 MG: 0.2 INJECTION, SOLUTION INTRAMUSCULAR; INTRAVENOUS at 11:08

## 2025-01-13 RX ADMIN — PROPOFOL 100 MG: 10 INJECTION, EMULSION INTRAVENOUS at 11:08

## 2025-01-13 ASSESSMENT — ACTIVITIES OF DAILY LIVING (ADL)
ADLS_ACUITY_SCORE: 41

## 2025-01-13 NOTE — H&P
[facility]    Pre-Endoscopy History and Physical     Brittany Michael MRN# [id number]   YOB: 1960 Age: 64 year old     Date of Procedure: 2025  Primary care provider: Juvenal Rascon  Type of Endoscopy: Colonoscopy with possible biopsy, possible polypectomy  Reason for Procedure: Screening, Family history  Type of Anesthesia Anticipated: Conscious Sedation    HPI:    Brittany is a 64 year old female who will be undergoing the above procedure.      Mother had anal carcinoma. Last colonoscopy .  Denies blood in stool or change in stool size.    A history and physical has been performed. The patient's medications and allergies have been reviewed. The risks and benefits of the procedure and the sedation options and risks were discussed with the patient.  All questions were answered and informed consent was obtained.      She denies a personal or family history of anesthesia complications or bleeding disorders.     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     BACK SURGERY      lumbar fusion    BIOPSY      BREAST SURGERY      Left breast lumpectomy,     COLONOSCOPY  2012    Procedure:COLONOSCOPY; Surgeon:CASEY JOHNSON; Location:[facility]    GYN SURGERY      tubal ligation    HEMORRHOIDECTOMY EXTERNAL  2012    Procedure:HEMORRHOIDECTOMY EXTERNAL; HEMORRHOIDECTOMY, PROCTOPLASTY, PARTIAL  INTERNAL SPHINCTEROTOMY, INTEROPERATIVE COLONOSCOPY; Surgeon:CASEY JOHNSON; Location:[facility]    HYSTERECTOMY, CESARIO  2003    hysterectomy    ORTHOPEDIC SURGERY      PROCTOPLASTY  2012    Procedure:PROCTOPLASTY; Surgeon:CASEY JOHNSON; Location:[facility]    SPHINCTEROTOMY RECTUM  2012    Procedure:SPHINCTEROTOMY RECTUM; Surgeon:CASEY JOHNSON; Location:[facility]       Social History     Tobacco Use    Smoking status: Former     Current packs/day: 0.00     Average packs/day: 1 pack/day for 26.0 years (26.0 ttl pk-yrs)     Types: Cigarettes     Start date: 1976     Quit date: 2002     Years since quittin.7    Smokeless tobacco: Never   Substance Use Topics    Alcohol use: Yes     Comment: beer daily in the evening       Family History   Problem Relation Age of Onset    Breast Cancer Mother     Cancer Mother         rectal cancer    Bipolar Disorder Sister     Unknown/Adopted Sister         neg breast biopsy    Psychotic Disorder Sister         depression, unsure if bipolar    Depression Father     Anxiety Disorder Father     Bipolar Disorder Father     Substance Abuse Father     Breast Cancer Niece     Breast Cancer Maternal Grandmother         and multiple sisters       Prior to Admission medications    Medication Sig Start Date End Date Taking? Authorizing Provider   buPROPion (WELLBUTRIN XL) 300 MG 24 hr tablet Take 1 tablet (300 mg) by mouth every morning. 24   Juvenal Rascon PA-C   celecoxib (CELEBREX) 200 MG capsule Take 1 capsule (200 mg) by mouth 2 times daily as needed for moderate pain. 24   Juvenal Rascon PA-C   cloNIDine (CATAPRES) 0.1 MG tablet Take 1-2 tablets (0.1-0.2 mg) by mouth 2 times daily. 24   Juvenal Rascon PA-C   FLUoxetine (PROZAC) 20 MG capsule Take 1 capsule (20 mg) by mouth daily. 24   Juvenal Rascon PA-C   hydrOXYzine HCl (ATARAX) 10 MG tablet Take 1 tablet (10 mg) by mouth 3 times daily as needed for anxiety. 24   Juvenal Rascon  "AUDREY   hydrOXYzine bhargavi (VISTARIL) 25 MG capsule Take 2-4 capsules ( mg) by mouth nightly as needed for anxiety (insomnia). TAKE 2 CAPSULES BY MOUTH 3 TIMES A DAY AS NEEDED FOR ANXIETY ATTACK 10/1/24   Juvenal Rascon PA-C       Allergies   Allergen Reactions    Adhesive Tape Swelling     STERISTRIPS    Amoxicillin Diarrhea        REVIEW OF SYSTEMS:   5 point ROS negative except as noted above in HPI, including Gen., Resp., CV, GI &  system review.    PHYSICAL EXAM:   BP (!) 140/73   Temp 98  F (36.7  C) (Oral)   Resp 18   Ht 1.778 m (5' 10\")   Wt 75.3 kg (166 lb)   LMP 05/08/2000   SpO2 96%   BMI 23.82 kg/m   Estimated body mass index is 23.82 kg/m  as calculated from the following:    Height as of this encounter: 1.778 m (5' 10\").    Weight as of this encounter: 75.3 kg (166 lb).   Constitutional: Awake, alert, no acute distress.  Eyes: No scleral icterus.  Conjunctiva are without injection.  ENMT: Mucous membranes moist, dentition and gums are intact.   Neck: Soft, supple, trachea midline.    Endocrine: n/a   Lymphatic: There is no cervical, submandibularadenopathy.  Respiratory: normal effortgs   Cardiovascular: S1, S2  Abdomen: Non-distended, non-tender,  No masses,  Musculoskeletal: No spinal or CVA tenderness. Full range of motion in the upper and lower extremities.    Skin: No skin rashes or lesions to inspection.  No petechia.    Neurologic: alerted and oriented 3x  Psychiatric: The patient's affect is not blunted and mood is appropriate.  DIAGNOSTICS:    Not indicated    IMPRESSION   ASA Class 2 - Mild systemic disease    PLAN:   Plan for Colonoscopy with possible biopsy, possible polypectomy. We discussed the risks, benefits and alternatives and the patient wished to proceed.  Patient is cleared for the above procedure.    The above has been forwarded to the consulting provider.    Juvenal Alcocer DO  [facility]        "

## 2025-01-13 NOTE — PROGRESS NOTES
[facility] [facility] DISCHARGE NOTE    Patient discharged to home at 1:41 PM via ambulation. Accompanied by spouse and staff. Discharge instructions reviewed with patient, opportunity offered to ask questions. Prescriptions - None ordered for discharge. All belongings sent with patient.    Radha Buckley RN

## 2025-01-13 NOTE — ANESTHESIA POSTPROCEDURE EVALUATION
Patient: Brittany Michael    Procedure: Procedure(s):  COLONOSCOPY, FLEXIBLE, WITH LESION REMOVAL USING SNARE       Anesthesia Type:  General    Note:  Disposition: Outpatient   Postop Pain Control: Uneventful            Sign Out: Well controlled pain   PONV: No   Neuro/Psych: Uneventful            Sign Out: Acceptable/Baseline neuro status   Airway/Respiratory: Uneventful            Sign Out: Acceptable/Baseline resp. status   CV/Hemodynamics: Uneventful            Sign Out: Acceptable CV status; No obvious hypovolemia; No obvious fluid overload   Other NRE: NONE   DID A NON-ROUTINE EVENT OCCUR? No           Last vitals:  Vitals Value Taken Time   /84 01/13/25 1145   Temp 36.8  C (98.2  F) 01/13/25 1145   Pulse 81 01/13/25 1145   Resp 15 01/13/25 1145   SpO2 96 % 01/13/25 1145   Vitals shown include unfiled device data.    Electronically Signed By: JOHN Michel CRNA  January 13, 2025  1:32 PM

## 2025-01-13 NOTE — ANESTHESIA CARE TRANSFER NOTE
Patient: Brittany Michael    Procedure: Procedure(s):  COLONOSCOPY, FLEXIBLE, WITH LESION REMOVAL USING SNARE       Diagnosis: Screen for colon cancer [Z12.11]  Diagnosis Additional Information: No value filed.    Anesthesia Type:   General     Note:    Oropharynx: oropharynx clear of all foreign objects and spontaneously breathing  Level of Consciousness: drowsy  Oxygen Supplementation: room air    Independent Airway: airway patency satisfactory and stable  Dentition: dentition unchanged  Vital Signs Stable: post-procedure vital signs reviewed and stable  Report to RN Given: handoff report given  Patient transferred to: Phase II          Vitals:  Vitals Value Taken Time   /84 01/13/25 1145   Temp 36.8  C (98.2  F) 01/13/25 1145   Pulse 81 01/13/25 1145   Resp 15 01/13/25 1145   SpO2 96 % 01/13/25 1145   Vitals shown include unfiled device data.    Electronically Signed By: JOHN Michel CRNA  January 13, 2025  11:58 AM

## 2025-01-15 LAB
PATH REPORT.COMMENTS IMP SPEC: NORMAL
PATH REPORT.COMMENTS IMP SPEC: NORMAL
PATH REPORT.FINAL DX SPEC: NORMAL
PATH REPORT.GROSS SPEC: NORMAL
PATH REPORT.MICROSCOPIC SPEC OTHER STN: NORMAL
PATH REPORT.RELEVANT HX SPEC: NORMAL
PHOTO IMAGE: NORMAL

## 2025-01-15 PROCEDURE — 88305 TISSUE EXAM BY PATHOLOGIST: CPT | Mod: 26 | Performed by: PATHOLOGY

## 2025-01-27 ENCOUNTER — PATIENT OUTREACH (OUTPATIENT)
Dept: GASTROENTEROLOGY | Facility: CLINIC | Age: 65
End: 2025-01-27
Payer: MEDICARE

## 2025-01-27 PROBLEM — D12.6 ADENOMATOUS POLYP OF COLON: Status: ACTIVE | Noted: 2025-01-27

## 2025-02-02 ENCOUNTER — MYC REFILL (OUTPATIENT)
Dept: FAMILY MEDICINE | Facility: CLINIC | Age: 65
End: 2025-02-02
Payer: MEDICARE

## 2025-02-02 DIAGNOSIS — F43.21 GRIEF REACTION: ICD-10-CM

## 2025-02-02 DIAGNOSIS — F41.9 ANXIETY: ICD-10-CM

## 2025-02-02 DIAGNOSIS — F33.1 MODERATE EPISODE OF RECURRENT MAJOR DEPRESSIVE DISORDER (H): ICD-10-CM

## 2025-02-03 RX ORDER — BUPROPION HYDROCHLORIDE 300 MG/1
300 TABLET ORAL EVERY MORNING
Qty: 90 TABLET | Refills: 3 | OUTPATIENT
Start: 2025-02-03

## 2025-02-03 RX ORDER — HYDROXYZINE HYDROCHLORIDE 10 MG/1
10 TABLET, FILM COATED ORAL 3 TIMES DAILY PRN
Qty: 30 TABLET | Refills: 3 | Status: SHIPPED | OUTPATIENT
Start: 2025-02-03

## 2025-02-05 ENCOUNTER — E-VISIT (OUTPATIENT)
Dept: FAMILY MEDICINE | Facility: CLINIC | Age: 65
End: 2025-02-05
Payer: COMMERCIAL

## 2025-02-05 DIAGNOSIS — N30.00 ACUTE CYSTITIS WITHOUT HEMATURIA: Primary | ICD-10-CM

## 2025-02-06 ENCOUNTER — LAB (OUTPATIENT)
Dept: LAB | Facility: CLINIC | Age: 65
End: 2025-02-06
Payer: COMMERCIAL

## 2025-02-06 DIAGNOSIS — R30.0 DYSURIA: ICD-10-CM

## 2025-02-06 LAB
ALBUMIN UR-MCNC: 100 MG/DL
APPEARANCE UR: ABNORMAL
BACTERIA #/AREA URNS HPF: ABNORMAL /HPF
BILIRUB UR QL STRIP: NEGATIVE
COLOR UR AUTO: YELLOW
GLUCOSE UR STRIP-MCNC: NEGATIVE MG/DL
HGB UR QL STRIP: ABNORMAL
KETONES UR STRIP-MCNC: NEGATIVE MG/DL
LEUKOCYTE ESTERASE UR QL STRIP: ABNORMAL
NITRATE UR QL: POSITIVE
PH UR STRIP: 5.5 [PH] (ref 5–7)
RBC #/AREA URNS AUTO: ABNORMAL /HPF
SP GR UR STRIP: >=1.03 (ref 1–1.03)
UROBILINOGEN UR STRIP-ACNC: 0.2 E.U./DL
WBC #/AREA URNS AUTO: >100 /HPF
WBC CLUMPS #/AREA URNS HPF: PRESENT /HPF

## 2025-02-06 RX ORDER — SULFAMETHOXAZOLE AND TRIMETHOPRIM 800; 160 MG/1; MG/1
1 TABLET ORAL 2 TIMES DAILY
Qty: 6 TABLET | Refills: 0 | Status: SHIPPED | OUTPATIENT
Start: 2025-02-06 | End: 2025-02-09

## 2025-02-06 NOTE — PATIENT INSTRUCTIONS
Dear Brittany Michael,     After reviewing your responses, I would like you to come in for a urine test to make sure we treat you correctly. This urine test is to evaluate you for a possible urinary tract infection, and should be scheduled for today or tomorrow. Schedule a Lab Only appointment here.     Lab appointments are not available at most locations on the weekends. If no Lab Only appointment is available, you should be seen in any of our convenient Walk-in or Urgent Care Centers, which can be found on our website here.     You will receive instructions with your results in [employer] once they are available.     If your symptoms worsen, you develop pain in your back or stomach, develop fevers, or are not improving in 5 days, please contact your primary care provider for an appointment or visit a Walk-in or Urgent Care Center to be seen.     Thanks again for choosing us as your health care partner,     Juvenal Rascon PA-C

## 2025-02-12 ASSESSMENT — PATIENT HEALTH QUESTIONNAIRE - PHQ9
SUM OF ALL RESPONSES TO PHQ QUESTIONS 1-9: 11
10. IF YOU CHECKED OFF ANY PROBLEMS, HOW DIFFICULT HAVE THESE PROBLEMS MADE IT FOR YOU TO DO YOUR WORK, TAKE CARE OF THINGS AT HOME, OR GET ALONG WITH OTHER PEOPLE: SOMEWHAT DIFFICULT
SUM OF ALL RESPONSES TO PHQ QUESTIONS 1-9: 11

## 2025-02-13 ENCOUNTER — OFFICE VISIT (OUTPATIENT)
Dept: FAMILY MEDICINE | Facility: CLINIC | Age: 65
End: 2025-02-13
Payer: COMMERCIAL

## 2025-02-13 VITALS
TEMPERATURE: 97.1 F | RESPIRATION RATE: 18 BRPM | BODY MASS INDEX: 22.81 KG/M2 | DIASTOLIC BLOOD PRESSURE: 76 MMHG | HEART RATE: 57 BPM | WEIGHT: 159 LBS | OXYGEN SATURATION: 97 % | SYSTOLIC BLOOD PRESSURE: 110 MMHG

## 2025-02-13 DIAGNOSIS — R79.89 ELEVATED SERUM CREATININE: ICD-10-CM

## 2025-02-13 DIAGNOSIS — F10.21 ALCOHOLISM IN REMISSION (H): ICD-10-CM

## 2025-02-13 DIAGNOSIS — Z13.1 SCREENING FOR DIABETES MELLITUS: ICD-10-CM

## 2025-02-13 DIAGNOSIS — F33.1 MODERATE EPISODE OF RECURRENT MAJOR DEPRESSIVE DISORDER (H): Primary | ICD-10-CM

## 2025-02-13 LAB
ANION GAP SERPL CALCULATED.3IONS-SCNC: 7 MMOL/L (ref 7–15)
BUN SERPL-MCNC: 11.6 MG/DL (ref 8–23)
CALCIUM SERPL-MCNC: 9.1 MG/DL (ref 8.8–10.4)
CHLORIDE SERPL-SCNC: 99 MMOL/L (ref 98–107)
CREAT SERPL-MCNC: 0.9 MG/DL (ref 0.51–0.95)
EGFRCR SERPLBLD CKD-EPI 2021: 71 ML/MIN/1.73M2
EST. AVERAGE GLUCOSE BLD GHB EST-MCNC: 111 MG/DL
GLUCOSE SERPL-MCNC: 102 MG/DL (ref 70–99)
HBA1C MFR BLD: 5.5 % (ref 0–5.6)
HCO3 SERPL-SCNC: 29 MMOL/L (ref 22–29)
POTASSIUM SERPL-SCNC: 4.2 MMOL/L (ref 3.4–5.3)
SODIUM SERPL-SCNC: 135 MMOL/L (ref 135–145)

## 2025-02-13 RX ORDER — FLUOXETINE 10 MG/1
10 TABLET, FILM COATED ORAL DAILY
Qty: 30 TABLET | Refills: 3 | Status: SHIPPED | OUTPATIENT
Start: 2025-02-13

## 2025-02-13 ASSESSMENT — ANXIETY QUESTIONNAIRES
GAD7 TOTAL SCORE: 10
4. TROUBLE RELAXING: MORE THAN HALF THE DAYS
1. FEELING NERVOUS, ANXIOUS, OR ON EDGE: MORE THAN HALF THE DAYS
GAD7 TOTAL SCORE: 10
IF YOU CHECKED OFF ANY PROBLEMS ON THIS QUESTIONNAIRE, HOW DIFFICULT HAVE THESE PROBLEMS MADE IT FOR YOU TO DO YOUR WORK, TAKE CARE OF THINGS AT HOME, OR GET ALONG WITH OTHER PEOPLE: SOMEWHAT DIFFICULT
2. NOT BEING ABLE TO STOP OR CONTROL WORRYING: NOT AT ALL
GAD7 TOTAL SCORE: 10
7. FEELING AFRAID AS IF SOMETHING AWFUL MIGHT HAPPEN: SEVERAL DAYS
3. WORRYING TOO MUCH ABOUT DIFFERENT THINGS: NOT AT ALL
6. BECOMING EASILY ANNOYED OR IRRITABLE: NEARLY EVERY DAY
5. BEING SO RESTLESS THAT IT IS HARD TO SIT STILL: MORE THAN HALF THE DAYS
7. FEELING AFRAID AS IF SOMETHING AWFUL MIGHT HAPPEN: SEVERAL DAYS
8. IF YOU CHECKED OFF ANY PROBLEMS, HOW DIFFICULT HAVE THESE MADE IT FOR YOU TO DO YOUR WORK, TAKE CARE OF THINGS AT HOME, OR GET ALONG WITH OTHER PEOPLE?: SOMEWHAT DIFFICULT

## 2025-02-13 ASSESSMENT — PAIN SCALES - GENERAL: PAINLEVEL_OUTOF10: MILD PAIN (2)

## 2025-02-13 NOTE — PATIENT INSTRUCTIONS
Increase Prozac to 25 or 30 mg daily with the addition of the 10 mg tablet.     Appointment scheduled with Laquita.     Follow-up with me on MyC if you need anything/want to discuss anything.

## 2025-02-13 NOTE — PROGRESS NOTES
Assessment & Plan   Moderate episode of recurrent major depressive disorder (H)  Symptoms overall are still improved from previous, but anxiety has worsened a little. We had decreased Prozac to 20 mg due to nausea, but I think she would benefit from a small increase and to restart counseling. Will trial a dose of 25 or 30 mg daily of Prozac and an appointment was made with out [facility] in person. Follow-up with my on MyC if needed based on symptoms.   - FLUoxetine (PROZAC) 10 MG tablet; Take 1 tablet (10 mg) by mouth daily. To take with 20 mg capsule for a total daily dose of 30 mg.    Alcoholism in remission (H)  Stable for over a decade. Continue to monitor.     Screening for diabetes mellitus  Due for recheck.   - Hemoglobin A1c; Future    Elevated serum creatinine  Discussed possibility of chronic kidney disease. Would be mild, like stage 2 or 3. Reassurance provided. Recheck today   - Basic metabolic panel  (Ca, Cl, CO2, Creat, Gluc, K, Na, BUN); Future    The longitudinal plan of care for the diagnosis(es)/condition(s) as documented were addressed during this visit. Due to the added complexity in care, I will continue to support Brittany in the subsequent management and with ongoing continuity of care.     Subjective   Brittany is a 64 year old, presenting for the following health issues:  Depression and Anxiety        2/13/2025     7:58 AM   Additional Questions   Roomed by Venecia OBRIEN CMA   Accompanied by Self     History of Present Illness       Mental Health Follow-up:  Patient presents to follow-up on Depression & Anxiety.Patient's depression since last visit has been:  Worse  The patient is not having other symptoms associated with depression.  Patient's anxiety since last visit has been:  Worse  The patient is not having other symptoms associated with anxiety.  Any significant life events: other  Patient is not feeling anxious or having panic attacks.  Patient has no concerns about alcohol or drug use.    Reason for  visit:  Recheck A1c    She eats 2-3 servings of fruits and vegetables daily.She consumes 0 sweetened beverage(s) daily.She exercises with enough effort to increase her heart rate 9 or less minutes per day.  She exercises with enough effort to increase her heart rate 3 or less days per week. She is missing 1 dose(s) of medications per week.    Review of Systems  See HPI       Objective    /76 (BP Location: Right arm, Patient Position: Sitting, Cuff Size: Adult Regular)   Pulse 57   Temp 97.1  F (36.2  C) (Tympanic)   Resp 18   Wt 72.1 kg (159 lb)   LMP 05/08/2000   SpO2 97%   BMI 22.81 kg/m    Body mass index is 22.81 kg/m .  Physical Exam   Constitutional: healthy, alert, and no distress  Head: Normocephalic. Atraumatic  Eyes: No conjunctival injection, sclera anicteric  Respiratory: No resp distress.  Musculoskeletal: extremities normal- no gross deformities noted, and normal muscle tone  Neurologic: Gait normal. CN 2-12 grossly intact  Psychiatric: mentation appears normal and affect normal/bright      Physician Attestation   I, Juvenal Rascon PA-C, was present with the medical/ADRIANE student who participated in the service and in the documentation of the note.  I have verified the history and personally performed the physical exam and medical decision making.  I agree with the assessment and plan of care as documented in the note.      uJvenal Rascon PA-C

## 2025-02-14 ENCOUNTER — TELEPHONE (OUTPATIENT)
Dept: FAMILY MEDICINE | Facility: CLINIC | Age: 65
End: 2025-02-14
Payer: MEDICARE

## 2025-02-14 NOTE — TELEPHONE ENCOUNTER
Prior Authorization Retail Medication Request    Medication/Dose: FLUoxetine (PROZAC) 10 MG tablet  Diagnosis and ICD code (if different than what is on RX):    New/renewal/insurance change PA/secondary ins. PA:  Previously Tried and Failed:    Rationale:      Covermymeds Key: [id number]    Clinic Information  Preferred routing pool for dept communication: [id number]    Silvina Crawford/ Patient

## 2025-02-18 NOTE — TELEPHONE ENCOUNTER
PA Initiation    Medication: FLUOXETINE HCL 10 MG PO TABS  Insurance Company: [employer] - Phone [phone] Fax [phone]  Pharmacy Filling the Rx: [facility] - [place], [place] - [address]  Filling Pharmacy Phone: [phone]  Filling Pharmacy Fax:    Start Date: 2/18/2025  Retail Pharmacy Prior Authorization Team   Phone: [phone]

## 2025-02-26 ENCOUNTER — OFFICE VISIT (OUTPATIENT)
Dept: BEHAVIORAL HEALTH | Facility: CLINIC | Age: 65
End: 2025-02-26
Payer: COMMERCIAL

## 2025-02-26 DIAGNOSIS — F43.23 ADJUSTMENT DISORDER WITH MIXED ANXIETY AND DEPRESSED MOOD: Primary | ICD-10-CM

## 2025-02-26 PROCEDURE — 90832 PSYTX W PT 30 MINUTES: CPT

## 2025-02-26 ASSESSMENT — PATIENT HEALTH QUESTIONNAIRE - PHQ9
SUM OF ALL RESPONSES TO PHQ QUESTIONS 1-9: 9
10. IF YOU CHECKED OFF ANY PROBLEMS, HOW DIFFICULT HAVE THESE PROBLEMS MADE IT FOR YOU TO DO YOUR WORK, TAKE CARE OF THINGS AT HOME, OR GET ALONG WITH OTHER PEOPLE: SOMEWHAT DIFFICULT
SUM OF ALL RESPONSES TO PHQ QUESTIONS 1-9: 9

## 2025-02-26 NOTE — PROGRESS NOTES
[facility]    Integrated Behavioral Health   Mental Health & Addiction Services      Progress Note - Initial [facility] Visit     Patient Name: Brittany Michael    Date: February 26, 2025  Service Type: Individual   Visit Start Time:  8:40am  Visit End Time:   9:00am    Attendees: Patient   Service Modality: In-person     [facility] Visit Activities (Refresh list every visit): NEW and [facility] Only       DATA:     Interactive Complexity: No   Crisis: No     Assessments completed prior to this visit:     The following assessments were completed by patient for this visit:  PHQ2:   Phq2 (   1999 [employer],All Rights Reserved. Used With Permission. Developed By Mitesh Raygoza,Shiraz Tamez And Colleagues,With An Educational Luis Antonio From [employer].)    2/26/2025  8:24 AM CST - Filed by Patient   The following questionnaire should only be answered by the patient. Are you the patient? Yes   Over the last 2 weeks, how often have you been bothered by any of the following problems?    Q1: Little interest or pleasure in doing things More than half the days   Q2: Feeling down, depressed or hopeless Several days   PHQ2 (   1999 [employer],ALL RIGHTS RESERVED. USED WITH PERMISSION. DEVELOPED BY MITESH RAYGOZA,SHIRAZ TAMEZ AND COLLEAGUES,WITH AN EDUCATIONAL LUIS ANTONIO FROM [employer].)    PHQ-2 Score (range: 0 - 6) 3   The following questionnaire should only be answered by the patient. Are you the patient? Yes   Over the last 2 weeks, how often have you been bothered by any of the following problems?    1. Little interest or pleasure in doing things Several days   2.  Feeling down, depressed, or hopeless Several days   3.  Trouble falling or staying asleep, or sleeping too much More than half the days   4.  Feeling tired or having little energy Several days   5.  Poor appetite or overeating More than half the days   6.  Feeling bad  about yourself - or that you are a failure or have let yourself or your family down Not at all   7.  Trouble concentrating on things, such as reading the newspaper or watching television More than half the days   8.  Moving or speaking so slowly that other people could have noticed. Or the opposite - being so fidgety or restless that you have been moving around a lot more than usual Not at all   9.  Thoughts that you would be better off dead, or of hurting yourself in some way Not at all   If you checked off any problems, how difficult have these problems made it for you to do your work, take care of things at home, or get along with other people? Somewhat difficult   PHQ9 TOTAL SCORE (range: 0 - 27) 9 (Mild depression)       PHQ9:       2/26/2024     9:44 AM 6/12/2024     6:57 AM 6/24/2024    10:08 AM 8/22/2024     8:15 AM 9/30/2024    10:37 AM 2/12/2025     2:51 PM 2/26/2025     8:24 AM   PHQ-9 SCORE   PHQ-9 Total Score MyChart 23 (Severe depression) 16 (Moderately severe depression) 13 (Moderate depression) 10 (Moderate depression) 10 (Moderate depression) 11 (Moderate depression) 9 (Mild depression)   PHQ-9 Total Score 23 16 13 10 10 11  9        Patient-reported     GAD2:       6/24/2024    10:22 AM   PORTER-2   Feeling nervous, anxious, or on edge 3   Not being able to stop or control worrying 3   PORTER-2 Total Score 6     GAD7:       12/19/2023     6:43 AM 2/18/2024    12:11 PM 3/6/2024     7:42 AM 6/12/2024     7:04 AM 6/24/2024    10:22 AM 8/20/2024     3:12 PM 2/13/2025     7:46 AM   PORTER-7 SCORE   Total Score 21 (severe anxiety) 15 (severe anxiety) 8 (mild anxiety)  20 (severe anxiety) 8 (mild anxiety) 10 (moderate anxiety)   Total Score 21 15 8 14 20 8 10        Patient-reported     CAGE-AID:       6/24/2024    10:24 AM   CAGE-AID Total Score   Total Score 0   Total Score MyChart 0 (A total score of 2 or greater is considered clinically significant)     PROMIS 10-Global Health (only subscores and total score):        6/24/2024    10:24 AM   PROMIS-10 Scores Only   Global Mental Health Score 12   Global Physical Health Score 17   PROMIS TOTAL - SUBSCORES 29     [place] Suicide Severity Rating Scale (Lifetime/Recent)      6/24/2024    11:41 AM 1/13/2025    10:40 AM 3/3/2025    10:48 AM   [place] Suicide Severity Rating (Lifetime/Recent)   Q1 Wished to be Dead (Past Month)  0-->no    Q2 Suicidal Thoughts (Past Month)  0-->no    Q6 Suicide Behavior (Lifetime)  0-->no    Level of Risk per Screen  no risks indicated    1. Wish to be Dead (Lifetime) N  N   2. Non-Specific Active Suicidal Thoughts (Lifetime) N  N        Referral:   Patient was referred to [facility] by self and primary care provider.    Reason for referral: determine behavioral health treatment options and assess client readiness and motivation to change.      [facility] introduced self and role. Discussed informed consent and limits to confidentiality.     Presenting Concerns/ Current Stressors:   Pt discussed ongoing grief progress and how she was managing her mental health symptoms.  Pt stated that she was looking to get back into support groups and focus on what is in her control.  Praised her and prompted coping skills.  Boundaries, grief      Therapeutic Interventions:  Motivational Interviewing (MI): Validated patient's thoughts, feelings and experience. Expressed respect for patient's autonomy in decision making.  Asked open-ended questions to invite patient's self-reflection and self-direction around change and what is important for them in working towards their goals.  Expressed and demonstrated empathy through reflective listening.   Psycho-education: Provided psycho-education about patient's behavioral health condition and symptoms.    Response to treatment interventions:   Patient was receptive to interventions utilized.     Safety Issues and Plan for Safety and Risk Management:     Patient denies a history of suicidal ideation, suicide attempts, self-injurious  behavior, homicidal ideation, homicidal behavior, and and other safety concerns   Patient denies current fears or concerns for personal safety.   Patient denies current or recent suicidal ideation or behaviors.   Patient denies current or recent homicidal ideation or behaviors.   Patient denies current or recent self injurious behavior or ideation.   Patient denies other safety concerns.   Recommended that patient call 911 or go to the local ED should there be a change in any of these risk factors   Patient reports there are no firearms in the house.       ASSESSMENT:   Mental Status:     Appearance:   Appropriate    Eye Contact:   Fair    Psychomotor Behavior: Normal    Attitude:   Cooperative    Orientation:   All   Speech Rate / Production: Normal/ Responsive   Volume:   Normal    Mood:    Anxious    Affect:    Appropriate    Thought Content:  Clear    Thought Form:  Coherent    Insight:    Fair         Diagnostic Criteria:   Adjustment Disorder  A. The development of emotional or behavioral symptoms in response to an identifiable stressor(s) occurring within 3 months of the onset of the stressor(s)  B. These symptoms or behaviors are clinically significant, as evidenced by one or both of the following:       - Marked distress that is out of proportion to the severity/intensity of the stressor (with consideration for external context & culture)       - Significant impairment in social, occupational, or other important areas of functioning  C. The stress-related disturbance does not meet criteria for another disorder & is not not an exacerbation of another mental disorder  D. The symptoms do not represent normal bereavement  E. Once the stressor or its consequences have terminated, the symptoms do not persist for more than an additional 6 months       * Adjustment Disorder with Mixed Anxiety and Depressed Mood: The predominant manifestation is a combination of depression and anxiety        DSM5 Diagnoses:  (Sustained by DSM5 Criteria Listed Above)     Diagnoses: Adjustment Disorders  309.28 (F43.23) With mixed anxiety and depressed mood     Psychosocial / Contextual Factors: Medical Complexities, Substance Use history/concerns, Relationship Concerns, Interpersonal Concerns, and Grief/Loss       Collateral Reports Completed:   Not Applicable        PLAN: (Homework, other):     1. Patient was provided:  recommendation to schedule follow-up with [facility]     2. Provider recommended the following referrals: explore coping skills.        3. Suicide Risk and Safety Concerns were assessed for Brittany Michael    Safety Plan:   Patient denied any current/recent/lifetime history of suicidal ideation and/or behaviors. Recommended that patient call 911 or go to the local ED should there be a change in any of these risk factors       BERTO Nunez, [facility]   February 26, 2025   Answers submitted by the patient for this visit:  Patient Health Questionnaire (Submitted on 2/26/2025)  If you checked off any problems, how difficult have these problems made it for you to do your work, take care of things at home, or get along with other people?: Somewhat difficult  PHQ9 TOTAL SCORE: 9

## 2025-03-03 ASSESSMENT — COLUMBIA-SUICIDE SEVERITY RATING SCALE - C-SSRS
2. HAVE YOU ACTUALLY HAD ANY THOUGHTS OF KILLING YOURSELF?: NO
1. HAVE YOU WISHED YOU WERE DEAD OR WISHED YOU COULD GO TO SLEEP AND NOT WAKE UP?: NO

## 2025-03-06 ENCOUNTER — TELEPHONE (OUTPATIENT)
Dept: BEHAVIORAL HEALTH | Facility: CLINIC | Age: 65
End: 2025-03-06
Payer: MEDICARE

## 2025-03-06 DIAGNOSIS — R69 DIAGNOSIS DEFERRED: Primary | ICD-10-CM

## 2025-03-06 NOTE — TELEPHONE ENCOUNTER
Per referral from [facility] Laquita PERDOMO, Peer  placed a call to the patient to provide telephone recovery support.  PRS received voicemail and left a message welcoming callback to [phone] between the hours of 8:00 am to 4 :00 pm, Monday - Friday.     Naveen Perez  Peer   [facility]   [phone]

## 2025-07-23 ENCOUNTER — OFFICE VISIT (OUTPATIENT)
Dept: FAMILY MEDICINE | Facility: CLINIC | Age: 65
End: 2025-07-23
Payer: MEDICARE

## 2025-07-23 VITALS
WEIGHT: 160 LBS | SYSTOLIC BLOOD PRESSURE: 106 MMHG | OXYGEN SATURATION: 97 % | HEIGHT: 71 IN | RESPIRATION RATE: 16 BRPM | HEART RATE: 76 BPM | DIASTOLIC BLOOD PRESSURE: 70 MMHG | TEMPERATURE: 98.2 F | BODY MASS INDEX: 22.4 KG/M2

## 2025-07-23 DIAGNOSIS — M25.551 CHRONIC PAIN OF RIGHT HIP: Primary | ICD-10-CM

## 2025-07-23 DIAGNOSIS — G89.29 CHRONIC PAIN OF RIGHT HIP: Primary | ICD-10-CM

## 2025-07-23 PROCEDURE — 3074F SYST BP LT 130 MM HG: CPT | Performed by: FAMILY MEDICINE

## 2025-07-23 PROCEDURE — 1125F AMNT PAIN NOTED PAIN PRSNT: CPT | Performed by: FAMILY MEDICINE

## 2025-07-23 PROCEDURE — 3078F DIAST BP <80 MM HG: CPT | Performed by: FAMILY MEDICINE

## 2025-07-23 PROCEDURE — 99213 OFFICE O/P EST LOW 20 MIN: CPT | Performed by: FAMILY MEDICINE

## 2025-07-23 ASSESSMENT — PAIN SCALES - GENERAL: PAINLEVEL_OUTOF10: MILD PAIN (2)

## 2025-07-23 NOTE — PROGRESS NOTES
Assessment & Plan     Chronic pain of right hip  Patient with 8 months of worsening hip pain. History of osteoarthritis and spinal fusion. No known trauma. Declines physical therapy today, discussed benefits of physical therapy including pain improvement, strengthening. Unable to obtain Xray today due to network issues at the clinic. She is requesting referral to orthopedics. Continue with over the counter pain management.  - Orthopedic  Referral; Future          Subjective   Brittany is a 64 year old, presenting for the following health issues:  No chief complaint on file.  HPI        Brittany presents today for eight months of chronic right sided hip pain. pain is anterior at the groin. Patient has tried icing, ibuprofen. Patient is requesting a referral to orthopedics today. patient s pain is worsening, causing difficulty ambulating. Denies weakness. [place], affecting quality of life. Patient has a history of osteoarthritis in the hands. Patient has a history of spinal fusion and hysterectomy. patient is not interested in physical therapy at this time, discuss benefits of physical therapy discussed I would recommend this. due to network problems today, unable to obtain hip x-ray.            Objective    LMP 05/08/2000   There is no height or weight on file to calculate BMI.  Physical Exam   no tenderness at the SI joint, lateral hip trochanteric bursa Favre positive at the anterior hip right symmetric strength            Signed Electronically by: Brenna Lee MD

## 2025-07-24 ENCOUNTER — PATIENT OUTREACH (OUTPATIENT)
Dept: CARE COORDINATION | Facility: CLINIC | Age: 65
End: 2025-07-24
Payer: MEDICARE

## 2025-07-28 ENCOUNTER — PATIENT OUTREACH (OUTPATIENT)
Dept: CARE COORDINATION | Facility: CLINIC | Age: 65
End: 2025-07-28
Payer: MEDICARE

## 2025-08-24 ENCOUNTER — MYC REFILL (OUTPATIENT)
Dept: FAMILY MEDICINE | Facility: CLINIC | Age: 65
End: 2025-08-24
Payer: MEDICARE

## 2025-08-24 DIAGNOSIS — F33.1 MODERATE EPISODE OF RECURRENT MAJOR DEPRESSIVE DISORDER (H): ICD-10-CM

## 2025-08-26 ENCOUNTER — OFFICE VISIT (OUTPATIENT)
Dept: ORTHOPEDICS | Facility: CLINIC | Age: 65
End: 2025-08-26
Payer: COMMERCIAL

## 2025-08-26 ENCOUNTER — ANCILLARY PROCEDURE (OUTPATIENT)
Dept: GENERAL RADIOLOGY | Facility: CLINIC | Age: 65
End: 2025-08-26
Attending: STUDENT IN AN ORGANIZED HEALTH CARE EDUCATION/TRAINING PROGRAM
Payer: COMMERCIAL

## 2025-08-26 DIAGNOSIS — M16.11 PRIMARY OSTEOARTHRITIS OF RIGHT HIP: ICD-10-CM

## 2025-08-26 DIAGNOSIS — M25.551 RIGHT HIP PAIN: Primary | ICD-10-CM

## 2025-08-26 DIAGNOSIS — M25.551 RIGHT HIP PAIN: ICD-10-CM

## 2025-08-26 PROCEDURE — 99204 OFFICE O/P NEW MOD 45 MIN: CPT | Mod: 25 | Performed by: STUDENT IN AN ORGANIZED HEALTH CARE EDUCATION/TRAINING PROGRAM

## 2025-08-26 PROCEDURE — 20611 DRAIN/INJ JOINT/BURSA W/US: CPT | Mod: RT | Performed by: STUDENT IN AN ORGANIZED HEALTH CARE EDUCATION/TRAINING PROGRAM

## 2025-08-26 RX ORDER — BETAMETHASONE SODIUM PHOSPHATE AND BETAMETHASONE ACETATE 3; 3 MG/ML; MG/ML
6 INJECTION, SUSPENSION INTRA-ARTICULAR; INTRALESIONAL; INTRAMUSCULAR; SOFT TISSUE
Status: COMPLETED | OUTPATIENT
Start: 2025-08-26 | End: 2025-08-26

## 2025-08-26 RX ORDER — ROPIVACAINE HYDROCHLORIDE 5 MG/ML
4 INJECTION, SOLUTION EPIDURAL; INFILTRATION; PERINEURAL
Status: COMPLETED | OUTPATIENT
Start: 2025-08-26 | End: 2025-08-26

## 2025-08-26 RX ADMIN — ROPIVACAINE HYDROCHLORIDE 4 ML: 5 INJECTION, SOLUTION EPIDURAL; INFILTRATION; PERINEURAL at 11:29

## 2025-08-26 RX ADMIN — BETAMETHASONE SODIUM PHOSPHATE AND BETAMETHASONE ACETATE 6 MG: 3; 3 INJECTION, SUSPENSION INTRA-ARTICULAR; INTRALESIONAL; INTRAMUSCULAR; SOFT TISSUE at 11:29

## 2025-09-01 ENCOUNTER — PATIENT OUTREACH (OUTPATIENT)
Dept: CARE COORDINATION | Facility: CLINIC | Age: 65
End: 2025-09-01
Payer: MEDICARE

## (undated) DEVICE — ENDO SNARE EXACTO COLD 9MM LOOP 2.4MMX230CM 00711115